# Patient Record
Sex: FEMALE | Race: WHITE | NOT HISPANIC OR LATINO | Employment: FULL TIME | URBAN - NONMETROPOLITAN AREA
[De-identification: names, ages, dates, MRNs, and addresses within clinical notes are randomized per-mention and may not be internally consistent; named-entity substitution may affect disease eponyms.]

---

## 2017-02-19 ENCOUNTER — HOSPITAL ENCOUNTER (EMERGENCY)
Facility: HOSPITAL | Age: 27
Discharge: HOME | End: 2017-02-19
Attending: EMERGENCY MEDICINE | Admitting: EMERGENCY MEDICINE

## 2017-02-19 ENCOUNTER — APPOINTMENT (OUTPATIENT)
Dept: GENERAL RADIOLOGY | Facility: HOSPITAL | Age: 27
End: 2017-02-19

## 2017-02-19 VITALS
HEIGHT: 64 IN | HEART RATE: 94 BPM | BODY MASS INDEX: 35.68 KG/M2 | WEIGHT: 209 LBS | SYSTOLIC BLOOD PRESSURE: 132 MMHG | TEMPERATURE: 99.2 F | OXYGEN SATURATION: 100 % | RESPIRATION RATE: 16 BRPM | DIASTOLIC BLOOD PRESSURE: 74 MMHG

## 2017-02-19 DIAGNOSIS — R10.13 EPIGASTRIC ABDOMINAL PAIN: Primary | ICD-10-CM

## 2017-02-19 LAB
ALBUMIN SERPL-MCNC: 4.3 G/DL (ref 3.4–4.8)
ALBUMIN/GLOB SERPL: 1.2 G/DL (ref 1.1–1.8)
ALP SERPL-CCNC: 76 U/L (ref 38–126)
ALT SERPL W P-5'-P-CCNC: 17 U/L (ref 9–52)
AMYLASE SERPL-CCNC: 37 U/L (ref 50–130)
ANION GAP SERPL CALCULATED.3IONS-SCNC: 12 MMOL/L (ref 5–15)
AST SERPL-CCNC: 27 U/L (ref 14–36)
B-HCG UR QL: NEGATIVE
BACTERIA UR QL AUTO: ABNORMAL /HPF
BASOPHILS # BLD AUTO: 0.02 10*3/MM3 (ref 0–0.2)
BASOPHILS NFR BLD AUTO: 0.2 % (ref 0–2)
BILIRUB SERPL-MCNC: 0.4 MG/DL (ref 0.2–1.3)
BILIRUB UR QL STRIP: NEGATIVE
BUN BLD-MCNC: 7 MG/DL (ref 7–21)
BUN/CREAT SERPL: 7.4 (ref 7–25)
CALCIUM SPEC-SCNC: 9.5 MG/DL (ref 8.4–10.2)
CHLORIDE SERPL-SCNC: 102 MMOL/L (ref 95–110)
CLARITY UR: ABNORMAL
CO2 SERPL-SCNC: 23 MMOL/L (ref 22–31)
COLOR UR: YELLOW
CREAT BLD-MCNC: 0.94 MG/DL (ref 0.5–1)
D-DIMER, QUANTITATIVE (MAD,POW, STR): 316 NG/ML (FEU) (ref 0–470)
DEPRECATED RDW RBC AUTO: 39.7 FL (ref 36.4–46.3)
EOSINOPHIL # BLD AUTO: 0.02 10*3/MM3 (ref 0–0.7)
EOSINOPHIL NFR BLD AUTO: 0.2 % (ref 0–7)
ERYTHROCYTE [DISTWIDTH] IN BLOOD BY AUTOMATED COUNT: 13.6 % (ref 11.5–14.5)
GFR SERPL CREATININE-BSD FRML MDRD: 72 ML/MIN/1.73 (ref 60–165)
GLOBULIN UR ELPH-MCNC: 3.5 GM/DL (ref 2.3–3.5)
GLUCOSE BLD-MCNC: 89 MG/DL (ref 60–100)
GLUCOSE UR STRIP-MCNC: NEGATIVE MG/DL
HCT VFR BLD AUTO: 37.7 % (ref 35–45)
HGB BLD-MCNC: 13.1 G/DL (ref 12–15.5)
HGB UR QL STRIP.AUTO: ABNORMAL
HOLD SPECIMEN: NORMAL
HOLD SPECIMEN: NORMAL
HYALINE CASTS UR QL AUTO: ABNORMAL /LPF
IMM GRANULOCYTES # BLD: 0.01 10*3/MM3 (ref 0–0.02)
IMM GRANULOCYTES NFR BLD: 0.1 % (ref 0–0.5)
KETONES UR QL STRIP: ABNORMAL
LEUKOCYTE ESTERASE UR QL STRIP.AUTO: ABNORMAL
LIPASE SERPL-CCNC: 23 U/L (ref 23–300)
LYMPHOCYTES # BLD AUTO: 1.24 10*3/MM3 (ref 0.6–4.2)
LYMPHOCYTES NFR BLD AUTO: 14.5 % (ref 10–50)
MCH RBC QN AUTO: 27.7 PG (ref 26.5–34)
MCHC RBC AUTO-ENTMCNC: 34.7 G/DL (ref 31.4–36)
MCV RBC AUTO: 79.7 FL (ref 80–98)
MONOCYTES # BLD AUTO: 0.98 10*3/MM3 (ref 0–0.9)
MONOCYTES NFR BLD AUTO: 11.4 % (ref 0–12)
NEUTROPHILS # BLD AUTO: 6.3 10*3/MM3 (ref 2–8.6)
NEUTROPHILS NFR BLD AUTO: 73.6 % (ref 37–80)
NITRITE UR QL STRIP: NEGATIVE
NRBC BLD MANUAL-RTO: 0 /100 WBC (ref 0–0)
PH UR STRIP.AUTO: 6.5 [PH] (ref 5–9)
PLATELET # BLD AUTO: 263 10*3/MM3 (ref 150–450)
PMV BLD AUTO: 11 FL (ref 8–12)
POTASSIUM BLD-SCNC: 4.1 MMOL/L (ref 3.5–5.1)
PROT SERPL-MCNC: 7.8 G/DL (ref 6.3–8.6)
PROT UR QL STRIP: ABNORMAL
RBC # BLD AUTO: 4.73 10*6/MM3 (ref 3.77–5.16)
RBC # UR: ABNORMAL /HPF
REF LAB TEST METHOD: ABNORMAL
SODIUM BLD-SCNC: 137 MMOL/L (ref 137–145)
SP GR UR STRIP: 1.03 (ref 1–1.03)
SQUAMOUS #/AREA URNS HPF: ABNORMAL /HPF
TROPONIN I SERPL-MCNC: <0.012 NG/ML
TROPONIN I SERPL-MCNC: <0.012 NG/ML
UROBILINOGEN UR QL STRIP: ABNORMAL
WBC NRBC COR # BLD: 8.57 10*3/MM3 (ref 3.2–9.8)
WBC UR QL AUTO: ABNORMAL /HPF
WHOLE BLOOD HOLD SPECIMEN: NORMAL
WHOLE BLOOD HOLD SPECIMEN: NORMAL

## 2017-02-19 PROCEDURE — 93010 ELECTROCARDIOGRAM REPORT: CPT | Performed by: INTERNAL MEDICINE

## 2017-02-19 RX ORDER — OMEPRAZOLE 20 MG/1
20 CAPSULE, DELAYED RELEASE ORAL DAILY
Qty: 30 CAPSULE | Refills: 0 | Status: SHIPPED | OUTPATIENT
Start: 2017-02-19 | End: 2017-02-24 | Stop reason: HOSPADM

## 2017-02-19 RX ORDER — PANTOPRAZOLE SODIUM 40 MG/10ML
80 INJECTION, POWDER, LYOPHILIZED, FOR SOLUTION INTRAVENOUS ONCE
Status: COMPLETED | OUTPATIENT
Start: 2017-02-19 | End: 2017-02-19

## 2017-02-19 RX ORDER — SODIUM CHLORIDE 0.9 % (FLUSH) 0.9 %
10 SYRINGE (ML) INJECTION AS NEEDED
Status: DISCONTINUED | OUTPATIENT
Start: 2017-02-19 | End: 2017-02-19 | Stop reason: HOSPADM

## 2017-02-19 RX ORDER — CETIRIZINE HYDROCHLORIDE 10 MG/1
10 TABLET ORAL DAILY
COMMUNITY

## 2017-02-19 RX ORDER — NITROGLYCERIN 0.4 MG/1
0.4 TABLET SUBLINGUAL
Status: DISCONTINUED | OUTPATIENT
Start: 2017-02-19 | End: 2017-02-19

## 2017-02-19 RX ORDER — PROMETHAZINE HYDROCHLORIDE 25 MG/ML
12.5 INJECTION, SOLUTION INTRAMUSCULAR; INTRAVENOUS ONCE
Status: COMPLETED | OUTPATIENT
Start: 2017-02-19 | End: 2017-02-19

## 2017-02-19 RX ORDER — MAGNESIUM HYDROXIDE/ALUMINUM HYDROXICE/SIMETHICONE 120; 1200; 1200 MG/30ML; MG/30ML; MG/30ML
30 SUSPENSION ORAL ONCE
Status: COMPLETED | OUTPATIENT
Start: 2017-02-19 | End: 2017-02-19

## 2017-02-19 RX ORDER — MORPHINE SULFATE 2 MG/ML
2 INJECTION, SOLUTION INTRAMUSCULAR; INTRAVENOUS ONCE
Status: COMPLETED | OUTPATIENT
Start: 2017-02-19 | End: 2017-02-19

## 2017-02-19 RX ORDER — ASPIRIN 81 MG/1
324 TABLET, CHEWABLE ORAL ONCE
Status: COMPLETED | OUTPATIENT
Start: 2017-02-19 | End: 2017-02-19

## 2017-02-19 RX ADMIN — Medication 10 ML: at 13:02

## 2017-02-19 RX ADMIN — CEFTRIAXONE 1 G: 1 INJECTION, POWDER, FOR SOLUTION INTRAMUSCULAR; INTRAVENOUS at 13:01

## 2017-02-19 RX ADMIN — ALUMINUM HYDROXIDE, MAGNESIUM HYDROXIDE, AND SIMETHICONE 30 ML: 200; 200; 20 SUSPENSION ORAL at 11:01

## 2017-02-19 RX ADMIN — LIDOCAINE HYDROCHLORIDE 15 ML: 20 SOLUTION ORAL; TOPICAL at 11:01

## 2017-02-19 RX ADMIN — Medication 10 ML: at 14:58

## 2017-02-19 RX ADMIN — MORPHINE SULFATE 2 MG: 2 INJECTION, SOLUTION INTRAMUSCULAR; INTRAVENOUS at 11:39

## 2017-02-19 RX ADMIN — ASPIRIN 81 MG 243 MG: 81 TABLET ORAL at 11:02

## 2017-02-19 RX ADMIN — Medication 10 ML: at 11:43

## 2017-02-19 RX ADMIN — PROMETHAZINE HYDROCHLORIDE 12.5 MG: 25 INJECTION INTRAMUSCULAR; INTRAVENOUS at 11:42

## 2017-02-19 RX ADMIN — PANTOPRAZOLE SODIUM 80 MG: 40 INJECTION, POWDER, FOR SOLUTION INTRAVENOUS at 14:56

## 2017-02-20 ENCOUNTER — APPOINTMENT (OUTPATIENT)
Dept: CT IMAGING | Facility: HOSPITAL | Age: 27
End: 2017-02-20

## 2017-02-20 ENCOUNTER — OFFICE VISIT (OUTPATIENT)
Dept: INTERNAL MEDICINE | Facility: CLINIC | Age: 27
End: 2017-02-20

## 2017-02-20 ENCOUNTER — HOSPITAL ENCOUNTER (OUTPATIENT)
Facility: HOSPITAL | Age: 27
Setting detail: OBSERVATION
Discharge: HOME | End: 2017-02-24
Attending: FAMILY MEDICINE | Admitting: FAMILY MEDICINE

## 2017-02-20 VITALS
HEIGHT: 64 IN | SYSTOLIC BLOOD PRESSURE: 140 MMHG | BODY MASS INDEX: 36.09 KG/M2 | DIASTOLIC BLOOD PRESSURE: 80 MMHG | WEIGHT: 211.4 LBS

## 2017-02-20 DIAGNOSIS — R13.10 DYSPHAGIA, UNSPECIFIED TYPE: Primary | ICD-10-CM

## 2017-02-20 DIAGNOSIS — R07.89 ATYPICAL CHEST PAIN: ICD-10-CM

## 2017-02-20 LAB
ANION GAP SERPL CALCULATED.3IONS-SCNC: 15 MMOL/L (ref 5–15)
B-HCG UR QL: NEGATIVE
BACTERIA SPEC AEROBE CULT: NORMAL
BACTERIA UR QL AUTO: ABNORMAL /HPF
BILIRUB UR QL STRIP: ABNORMAL
BUN BLD-MCNC: 11 MG/DL (ref 7–21)
BUN/CREAT SERPL: 11.7 (ref 7–25)
CALCIUM SPEC-SCNC: 9 MG/DL (ref 8.4–10.2)
CHLORIDE SERPL-SCNC: 104 MMOL/L (ref 95–110)
CLARITY UR: CLEAR
CO2 SERPL-SCNC: 18 MMOL/L (ref 22–31)
COLOR UR: YELLOW
CREAT BLD-MCNC: 0.94 MG/DL (ref 0.5–1)
DEPRECATED RDW RBC AUTO: 40.1 FL (ref 36.4–46.3)
ERYTHROCYTE [DISTWIDTH] IN BLOOD BY AUTOMATED COUNT: 13.6 % (ref 11.5–14.5)
GFR SERPL CREATININE-BSD FRML MDRD: 72 ML/MIN/1.73 (ref 71–165)
GLUCOSE BLD-MCNC: 70 MG/DL (ref 60–100)
GLUCOSE UR STRIP-MCNC: NEGATIVE MG/DL
HCT VFR BLD AUTO: 35.6 % (ref 35–45)
HGB BLD-MCNC: 12 G/DL (ref 12–15.5)
HGB UR QL STRIP.AUTO: ABNORMAL
HYALINE CASTS UR QL AUTO: ABNORMAL /LPF
KETONES UR QL STRIP: ABNORMAL
LEUKOCYTE ESTERASE UR QL STRIP.AUTO: NEGATIVE
MCH RBC QN AUTO: 27.1 PG (ref 26.5–34)
MCHC RBC AUTO-ENTMCNC: 33.7 G/DL (ref 31.4–36)
MCV RBC AUTO: 80.4 FL (ref 80–98)
NITRITE UR QL STRIP: NEGATIVE
PH UR STRIP.AUTO: 5.5 [PH] (ref 5–9)
PLATELET # BLD AUTO: 229 10*3/MM3 (ref 150–450)
PMV BLD AUTO: 11.1 FL (ref 8–12)
POTASSIUM BLD-SCNC: 3.8 MMOL/L (ref 3.5–5.1)
PROT UR QL STRIP: ABNORMAL
RBC # BLD AUTO: 4.43 10*6/MM3 (ref 3.77–5.16)
RBC # UR: ABNORMAL /HPF
REF LAB TEST METHOD: ABNORMAL
SODIUM BLD-SCNC: 137 MMOL/L (ref 137–145)
SP GR UR STRIP: 1.04 (ref 1–1.03)
SQUAMOUS #/AREA URNS HPF: ABNORMAL /HPF
UROBILINOGEN UR QL STRIP: ABNORMAL
WBC NRBC COR # BLD: 8.22 10*3/MM3 (ref 3.2–9.8)
WBC UR QL AUTO: ABNORMAL /HPF

## 2017-02-20 PROCEDURE — 85027 COMPLETE CBC AUTOMATED: CPT | Performed by: NURSE PRACTITIONER

## 2017-02-20 PROCEDURE — 92610 EVALUATE SWALLOWING FUNCTION: CPT | Performed by: SPEECH-LANGUAGE PATHOLOGIST

## 2017-02-20 PROCEDURE — G0378 HOSPITAL OBSERVATION PER HR: HCPCS

## 2017-02-20 PROCEDURE — 81025 URINE PREGNANCY TEST: CPT | Performed by: NURSE PRACTITIONER

## 2017-02-20 PROCEDURE — 99213 OFFICE O/P EST LOW 20 MIN: CPT | Performed by: INTERNAL MEDICINE

## 2017-02-20 PROCEDURE — 87086 URINE CULTURE/COLONY COUNT: CPT | Performed by: NURSE PRACTITIONER

## 2017-02-20 PROCEDURE — 25010000002 HYDROMORPHONE PER 4 MG: Performed by: INTERNAL MEDICINE

## 2017-02-20 PROCEDURE — 80048 BASIC METABOLIC PNL TOTAL CA: CPT | Performed by: NURSE PRACTITIONER

## 2017-02-20 PROCEDURE — G8996 SWALLOW CURRENT STATUS: HCPCS | Performed by: SPEECH-LANGUAGE PATHOLOGIST

## 2017-02-20 PROCEDURE — 71260 CT THORAX DX C+: CPT

## 2017-02-20 PROCEDURE — 96375 TX/PRO/DX INJ NEW DRUG ADDON: CPT

## 2017-02-20 PROCEDURE — G8997 SWALLOW GOAL STATUS: HCPCS | Performed by: SPEECH-LANGUAGE PATHOLOGIST

## 2017-02-20 PROCEDURE — 96376 TX/PRO/DX INJ SAME DRUG ADON: CPT

## 2017-02-20 PROCEDURE — G8998 SWALLOW D/C STATUS: HCPCS | Performed by: SPEECH-LANGUAGE PATHOLOGIST

## 2017-02-20 PROCEDURE — 25010000002 ONDANSETRON PER 1 MG: Performed by: NURSE PRACTITIONER

## 2017-02-20 PROCEDURE — 81001 URINALYSIS AUTO W/SCOPE: CPT | Performed by: NURSE PRACTITIONER

## 2017-02-20 PROCEDURE — 0 IOPAMIDOL 61 % SOLUTION: Performed by: FAMILY MEDICINE

## 2017-02-20 RX ORDER — CETIRIZINE HYDROCHLORIDE 10 MG/1
10 TABLET ORAL DAILY
Status: DISCONTINUED | OUTPATIENT
Start: 2017-02-20 | End: 2017-02-24 | Stop reason: HOSPADM

## 2017-02-20 RX ORDER — NORGESTIMATE AND ETHINYL ESTRADIOL 7DAYSX3 28
1 KIT ORAL DAILY
Status: DISCONTINUED | OUTPATIENT
Start: 2017-02-20 | End: 2017-02-24 | Stop reason: HOSPADM

## 2017-02-20 RX ORDER — MONTELUKAST SODIUM 10 MG/1
10 TABLET ORAL NIGHTLY
Status: DISCONTINUED | OUTPATIENT
Start: 2017-02-20 | End: 2017-02-24 | Stop reason: HOSPADM

## 2017-02-20 RX ORDER — NITROGLYCERIN 0.4 MG/1
0.4 TABLET SUBLINGUAL
Status: DISCONTINUED | OUTPATIENT
Start: 2017-02-20 | End: 2017-02-24 | Stop reason: HOSPADM

## 2017-02-20 RX ORDER — PANTOPRAZOLE SODIUM 40 MG/10ML
40 INJECTION, POWDER, LYOPHILIZED, FOR SOLUTION INTRAVENOUS
Status: DISCONTINUED | OUTPATIENT
Start: 2017-02-20 | End: 2017-02-24 | Stop reason: HOSPADM

## 2017-02-20 RX ORDER — DOCUSATE SODIUM 100 MG/1
100 CAPSULE, LIQUID FILLED ORAL 2 TIMES DAILY
Status: DISCONTINUED | OUTPATIENT
Start: 2017-02-20 | End: 2017-02-24 | Stop reason: HOSPADM

## 2017-02-20 RX ORDER — DOCUSATE SODIUM 100 MG/1
100 CAPSULE, LIQUID FILLED ORAL DAILY
COMMUNITY
End: 2017-02-24 | Stop reason: HOSPADM

## 2017-02-20 RX ORDER — ACETAMINOPHEN 325 MG/1
650 TABLET ORAL EVERY 4 HOURS PRN
Status: DISCONTINUED | OUTPATIENT
Start: 2017-02-20 | End: 2017-02-24 | Stop reason: HOSPADM

## 2017-02-20 RX ORDER — DOCUSATE SODIUM 100 MG/1
100 CAPSULE, LIQUID FILLED ORAL 2 TIMES DAILY
COMMUNITY

## 2017-02-20 RX ORDER — CITALOPRAM 10 MG/1
10 TABLET ORAL DAILY
Status: DISCONTINUED | OUTPATIENT
Start: 2017-02-20 | End: 2017-02-24 | Stop reason: HOSPADM

## 2017-02-20 RX ORDER — SUCRALFATE ORAL 1 G/10ML
1 SUSPENSION ORAL EVERY 6 HOURS SCHEDULED
Status: DISCONTINUED | OUTPATIENT
Start: 2017-02-20 | End: 2017-02-24 | Stop reason: HOSPADM

## 2017-02-20 RX ORDER — SODIUM CHLORIDE 0.9 % (FLUSH) 0.9 %
1-10 SYRINGE (ML) INJECTION AS NEEDED
Status: DISCONTINUED | OUTPATIENT
Start: 2017-02-20 | End: 2017-02-24 | Stop reason: HOSPADM

## 2017-02-20 RX ORDER — ONDANSETRON 2 MG/ML
4 INJECTION INTRAMUSCULAR; INTRAVENOUS EVERY 6 HOURS PRN
Status: DISCONTINUED | OUTPATIENT
Start: 2017-02-20 | End: 2017-02-21

## 2017-02-20 RX ORDER — SODIUM CHLORIDE 9 MG/ML
75 INJECTION, SOLUTION INTRAVENOUS CONTINUOUS
Status: DISCONTINUED | OUTPATIENT
Start: 2017-02-20 | End: 2017-02-24 | Stop reason: HOSPADM

## 2017-02-20 RX ORDER — MORPHINE SULFATE 2 MG/ML
2 INJECTION, SOLUTION INTRAMUSCULAR; INTRAVENOUS EVERY 4 HOURS PRN
Status: DISCONTINUED | OUTPATIENT
Start: 2017-02-20 | End: 2017-02-20

## 2017-02-20 RX ORDER — FAMOTIDINE 10 MG/ML
20 INJECTION, SOLUTION INTRAVENOUS 2 TIMES DAILY
Status: DISCONTINUED | OUTPATIENT
Start: 2017-02-20 | End: 2017-02-24 | Stop reason: HOSPADM

## 2017-02-20 RX ORDER — DICYCLOMINE HCL 20 MG
10 TABLET ORAL 3 TIMES DAILY PRN
Status: DISCONTINUED | OUTPATIENT
Start: 2017-02-20 | End: 2017-02-21

## 2017-02-20 RX ADMIN — SODIUM CHLORIDE 75 ML/HR: 9 INJECTION, SOLUTION INTRAVENOUS at 13:38

## 2017-02-20 RX ADMIN — HYDROMORPHONE HYDROCHLORIDE 0.5 MG: 1 INJECTION, SOLUTION INTRAMUSCULAR; INTRAVENOUS; SUBCUTANEOUS at 19:48

## 2017-02-20 RX ADMIN — FAMOTIDINE 20 MG: 10 INJECTION, SOLUTION INTRAVENOUS at 13:38

## 2017-02-20 RX ADMIN — HYDROMORPHONE HYDROCHLORIDE 0.5 MG: 1 INJECTION, SOLUTION INTRAMUSCULAR; INTRAVENOUS; SUBCUTANEOUS at 23:45

## 2017-02-20 RX ADMIN — ONDANSETRON 4 MG: 2 INJECTION INTRAMUSCULAR; INTRAVENOUS at 13:51

## 2017-02-20 RX ADMIN — ONDANSETRON 4 MG: 2 INJECTION INTRAMUSCULAR; INTRAVENOUS at 23:51

## 2017-02-20 RX ADMIN — ACETAMINOPHEN 650 MG: 325 TABLET ORAL at 13:51

## 2017-02-20 RX ADMIN — FAMOTIDINE 20 MG: 10 INJECTION, SOLUTION INTRAVENOUS at 18:21

## 2017-02-20 RX ADMIN — NITROGLYCERIN 0.4 MG: 0.4 TABLET SUBLINGUAL at 14:11

## 2017-02-20 RX ADMIN — SUCRALFATE 1 G: 1 SUSPENSION ORAL at 21:00

## 2017-02-20 RX ADMIN — NITROGLYCERIN 0.4 MG: 0.4 TABLET SUBLINGUAL at 14:01

## 2017-02-20 RX ADMIN — PANTOPRAZOLE SODIUM 40 MG: 40 INJECTION, POWDER, FOR SOLUTION INTRAVENOUS at 18:21

## 2017-02-20 RX ADMIN — IOPAMIDOL 90 ML: 612 INJECTION, SOLUTION INTRAVENOUS at 21:30

## 2017-02-20 RX ADMIN — NITROGLYCERIN 0.4 MG: 0.4 TABLET SUBLINGUAL at 14:16

## 2017-02-20 NOTE — PROGRESS NOTES
"Subjective   Elsie Carson is a 26 y.o. female who presents complaining of chest pain and dysphagia.  Symptoms have been present for 2 days. Symptoms are worse since yesterday.   Patient has not been able to eat  or drink due to severe pain while swallowing.  She denies any shortness of breath or coughing.    Patient was seen in ER yesterday.  Cardiac enzymes and ECG were normal.   Chest X-Ray did not show any abnormalities.    Patient was given IV Protonix and discharged home on PO Prilosec.  She is feeling worse today.    Past Medical History   Diagnosis Date   • Acute maxillary sinusitis    • Anxiety    • Asthma    • Depression    • Encounter for gynecological examination (general) (routine) without abnormal findings    • Hyperlipidemia    • Hypertension    • Obesity      Past Surgical History   Procedure Laterality Date   • Injection of medication  10/25/2015     Celestone (betamethasone) (1)     • Injection of medication  11/17/2010     Depo Medrol (Methylprednisone) (1)     • Sinus surgery       \"multiple\"   • Tonsillectomy and adenoidectomy     • Myringotomy w/ tubes       has had twice       Social History   Substance Use Topics   • Smoking status: Never Smoker   • Smokeless tobacco: Not on file   • Alcohol use No     Family History   Problem Relation Age of Onset   • Breast cancer Other    • Coronary artery disease Other    • Diabetes Other    • Hyperlipidemia Other    • Hypertension Other    • Hypertension Mother    • Heart disease Father    • Hypertension Father      Allergies   Allergen Reactions   • Amoxicillin    • Augmentin [Amoxicillin-Pot Clavulanate]    • Codeine    • Erythrocin [Erythromycin]    • Penicillins    • Sulfa Antibiotics      Current Facility-Administered Medications on File Prior to Visit   Medication Dose Route Frequency Provider Last Rate Last Dose   • [COMPLETED] cefTRIAXone (ROCEPHIN) 1 g/100 mL 0.9% NS (MBP)  1 g Intravenous Once RAYMOND Taylor   Stopped at 02/19/17 " 1331   • [COMPLETED] pantoprazole (PROTONIX) injection 80 mg  80 mg Intravenous Once RAYMOND Taylor   80 mg at 02/19/17 1456   • [DISCONTINUED] sodium chloride 0.9 % flush 10 mL  10 mL Intravenous PRN Saw Morris MD   10 mL at 02/19/17 1458   • [DISCONTINUED] sodium chloride 0.9 % flush 10 mL  10 mL Intravenous PRN Saw Morris MD   10 mL at 02/19/17 1302     Current Outpatient Prescriptions on File Prior to Visit   Medication Sig Dispense Refill   • amLODIPine (NORVASC) 5 MG tablet Take 1 tablet by mouth Daily. 30 tablet 5   • cetirizine (zyrTEC) 10 MG tablet Take 10 mg by mouth Daily.     • citalopram (CeleXA) 10 MG tablet Take 1 tablet by mouth Daily. 30 tablet 0   • dicyclomine (BENTYL) 20 MG tablet TAKE ONE TABLET BY MOUTH THREE TIMES DAILY AS NEEDED 90 tablet 0   • norgestimate-ethinyl estradiol (TRI-SPRINTEC) 0.18/0.215/0.25 MG-35 MCG per tablet Take 1 tablet by mouth Daily. 28 tablet 5   • omeprazole (priLOSEC) 20 MG capsule Take 1 capsule by mouth Daily. 30 capsule 0   • montelukast (SINGULAIR) 10 MG tablet Take 1 tablet by mouth Every Night. 30 tablet 5     Review of Systems   Constitutional: Negative for chills and fever.   HENT: Negative for mouth sores and nosebleeds.    Eyes: Negative for photophobia and visual disturbance.   Respiratory: Negative for apnea, cough and wheezing.    Cardiovascular: Positive for chest pain. Negative for leg swelling.   Gastrointestinal: Positive for nausea. Negative for constipation and diarrhea.        Positive for dysphagia   Endocrine: Negative for cold intolerance, polydipsia and polyuria.   Musculoskeletal: Negative for back pain and joint swelling.   Hematological: Negative for adenopathy. Does not bruise/bleed easily.       Objective   Physical Exam   Constitutional: She is oriented to person, place, and time. She appears well-nourished.   Patient appears uncomfortable   HENT:   Mucous membranes appear dry   Eyes: EOM are normal.   Cardiovascular: Normal rate  and regular rhythm.    Pulmonary/Chest: Effort normal and breath sounds normal.   Abdominal: Soft. Bowel sounds are normal. She exhibits no distension and no mass. No hernia.   Neurological: She is alert and oriented to person, place, and time. She has normal reflexes.   Skin: Skin is warm and dry.   Nursing note and vitals reviewed.      Admission on 02/19/2017, Discharged on 02/19/2017   Component Date Value Ref Range Status   • Glucose 02/19/2017 89  60 - 100 mg/dL Final   • BUN 02/19/2017 7  7 - 21 mg/dL Final   • Creatinine 02/19/2017 0.94  0.50 - 1.00 mg/dL Final   • Sodium 02/19/2017 137  137 - 145 mmol/L Final   • Potassium 02/19/2017 4.1  3.5 - 5.1 mmol/L Final   • Chloride 02/19/2017 102  95 - 110 mmol/L Final   • CO2 02/19/2017 23.0  22.0 - 31.0 mmol/L Final   • Calcium 02/19/2017 9.5  8.4 - 10.2 mg/dL Final   • Total Protein 02/19/2017 7.8  6.3 - 8.6 g/dL Final   • Albumin 02/19/2017 4.30  3.40 - 4.80 g/dL Final   • ALT (SGPT) 02/19/2017 17  9 - 52 U/L Final   • AST (SGOT) 02/19/2017 27  14 - 36 U/L Final   • Alkaline Phosphatase 02/19/2017 76  38 - 126 U/L Final   • Total Bilirubin 02/19/2017 0.4  0.2 - 1.3 mg/dL Final   • eGFR Non  Amer 02/19/2017 72  >60 mL/min/1.73 Final   • Globulin 02/19/2017 3.5  2.3 - 3.5 gm/dL Final   • A/G Ratio 02/19/2017 1.2  1.1 - 1.8 g/dL Final   • BUN/Creatinine Ratio 02/19/2017 7.4  7.0 - 25.0 Final   • Anion Gap 02/19/2017 12.0  5.0 - 15.0 mmol/L Final   • Color, UA 02/19/2017 Yellow  Yellow, Straw, Dark Yellow, Anum Final   • Appearance, UA 02/19/2017 Cloudy* Clear Final   • pH, UA 02/19/2017 6.5  5.0 - 9.0 Final   • Specific Gravity, UA 02/19/2017 1.028  1.003 - 1.030 Final   • Glucose, UA 02/19/2017 Negative  Negative Final   • Ketones, UA 02/19/2017 40 mg/dL (2+)* Negative Final   • Bilirubin, UA 02/19/2017 Negative  Negative Final   • Blood, UA 02/19/2017 Trace* Negative Final   • Protein, UA 02/19/2017 30 mg/dL (1+)* Negative Final   • Leuk Esterase, UA  02/19/2017 Moderate (2+)* Negative Final   • Nitrite, UA 02/19/2017 Negative  Negative Final   • Urobilinogen, UA 02/19/2017 1.0 E.U./dL  0.2 - 1.0 E.U./dL Final   • HCG, Urine QL 02/19/2017 Negative  Negative Final   • WBC 02/19/2017 8.57  3.20 - 9.80 10*3/mm3 Final   • RBC 02/19/2017 4.73  3.77 - 5.16 10*6/mm3 Final   • Hemoglobin 02/19/2017 13.1  12.0 - 15.5 g/dL Final   • Hematocrit 02/19/2017 37.7  35.0 - 45.0 % Final   • MCV 02/19/2017 79.7* 80.0 - 98.0 fL Final   • MCH 02/19/2017 27.7  26.5 - 34.0 pg Final   • MCHC 02/19/2017 34.7  31.4 - 36.0 g/dL Final   • RDW 02/19/2017 13.6  11.5 - 14.5 % Final   • RDW-SD 02/19/2017 39.7  36.4 - 46.3 fl Final   • MPV 02/19/2017 11.0  8.0 - 12.0 fL Final   • Platelets 02/19/2017 263  150 - 450 10*3/mm3 Final   • Neutrophil % 02/19/2017 73.6  37.0 - 80.0 % Final   • Lymphocyte % 02/19/2017 14.5  10.0 - 50.0 % Final   • Monocyte % 02/19/2017 11.4  0.0 - 12.0 % Final   • Eosinophil % 02/19/2017 0.2  0.0 - 7.0 % Final   • Basophil % 02/19/2017 0.2  0.0 - 2.0 % Final   • Immature Grans % 02/19/2017 0.1  0.0 - 0.5 % Final   • Neutrophils, Absolute 02/19/2017 6.30  2.00 - 8.60 10*3/mm3 Final   • Lymphocytes, Absolute 02/19/2017 1.24  0.60 - 4.20 10*3/mm3 Final   • Monocytes, Absolute 02/19/2017 0.98* 0.00 - 0.90 10*3/mm3 Final   • Eosinophils, Absolute 02/19/2017 0.02  0.00 - 0.70 10*3/mm3 Final   • Basophils, Absolute 02/19/2017 0.02  0.00 - 0.20 10*3/mm3 Final   • Immature Grans, Absolute 02/19/2017 0.01  0.00 - 0.02 10*3/mm3 Final   • nRBC 02/19/2017 0.0  0.0 - 0.0 /100 WBC Final   • Extra Tube 02/19/2017 hold for add-on   Final   • Extra Tube 02/19/2017 Hold for add-ons.   Final   • Extra Tube 02/19/2017 hold for add-on   Final   • Extra Tube 02/19/2017 Hold for add-ons.   Final   • RBC, UA 02/19/2017 0-2* None Seen /HPF Final   • WBC, UA 02/19/2017 31-50* None Seen, 0-2, 3-5 /HPF Final   • Bacteria, UA 02/19/2017 2+* None Seen /HPF Final   • Squamous Epithelial Cells, UA  02/19/2017 13-20* None Seen, 0-2 /HPF Final   • Hyaline Casts, UA 02/19/2017 None Seen  None Seen /LPF Final   • Methodology 02/19/2017 Automated Microscopy   Final   • Urine Culture 02/19/2017 Mixed Culture   Final   • D-Dimer, Quantitative 02/19/2017 316  0 - 470 ng/mL (FEU) Final   • Troponin I 02/19/2017 <0.012  <=0.034 ng/mL Final   • Amylase 02/19/2017 37* 50 - 130 U/L Final   • Lipase 02/19/2017 23  23 - 300 U/L Final   • Troponin I 02/19/2017 <0.012  <=0.034 ng/mL Final         Patient Active Problem List   Diagnosis   • Essential hypertension   • Obesity (BMI 35.0-39.9 without comorbidity)   • Mild intermittent asthma without complication   • Generalized anxiety disorder               Assessment    Diagnosis Plan   1. Dysphagia, unspecified type     2. Atypical chest pain           Plan      Patient is referred to [facility] for observation for IV fluids, IV Protionix and further evaluation.

## 2017-02-21 ENCOUNTER — ANESTHESIA EVENT (OUTPATIENT)
Dept: GASTROENTEROLOGY | Facility: HOSPITAL | Age: 27
End: 2017-02-21

## 2017-02-21 ENCOUNTER — ANESTHESIA (OUTPATIENT)
Dept: GASTROENTEROLOGY | Facility: HOSPITAL | Age: 27
End: 2017-02-21

## 2017-02-21 LAB
ANION GAP SERPL CALCULATED.3IONS-SCNC: 13 MMOL/L (ref 5–15)
BUN BLD-MCNC: 8 MG/DL (ref 7–21)
BUN/CREAT SERPL: 9.3 (ref 7–25)
CALCIUM SPEC-SCNC: 8.6 MG/DL (ref 8.4–10.2)
CHLORIDE SERPL-SCNC: 107 MMOL/L (ref 95–110)
CO2 SERPL-SCNC: 17 MMOL/L (ref 22–31)
CREAT BLD-MCNC: 0.86 MG/DL (ref 0.5–1)
DEPRECATED RDW RBC AUTO: 41.1 FL (ref 36.4–46.3)
ERYTHROCYTE [DISTWIDTH] IN BLOOD BY AUTOMATED COUNT: 13.8 % (ref 11.5–14.5)
GFR SERPL CREATININE-BSD FRML MDRD: 80 ML/MIN/1.73 (ref 60–165)
GLUCOSE BLD-MCNC: 70 MG/DL (ref 60–100)
HCT VFR BLD AUTO: 35 % (ref 35–45)
HGB BLD-MCNC: 11.7 G/DL (ref 12–15.5)
HIV1+2 AB SER QL: NEGATIVE
MCH RBC QN AUTO: 27 PG (ref 26.5–34)
MCHC RBC AUTO-ENTMCNC: 33.4 G/DL (ref 31.4–36)
MCV RBC AUTO: 80.8 FL (ref 80–98)
PLATELET # BLD AUTO: 218 10*3/MM3 (ref 150–450)
PMV BLD AUTO: 10.9 FL (ref 8–12)
POTASSIUM BLD-SCNC: 4.1 MMOL/L (ref 3.5–5.1)
RBC # BLD AUTO: 4.33 10*6/MM3 (ref 3.77–5.16)
SODIUM BLD-SCNC: 137 MMOL/L (ref 137–145)
WBC NRBC COR # BLD: 7.41 10*3/MM3 (ref 3.2–9.8)

## 2017-02-21 PROCEDURE — 88312 SPECIAL STAINS GROUP 1: CPT | Performed by: INTERNAL MEDICINE

## 2017-02-21 PROCEDURE — G0378 HOSPITAL OBSERVATION PER HR: HCPCS

## 2017-02-21 PROCEDURE — 96376 TX/PRO/DX INJ SAME DRUG ADON: CPT

## 2017-02-21 PROCEDURE — 25010000002 ACYCLOVIR PER 5 MG: Performed by: INTERNAL MEDICINE

## 2017-02-21 PROCEDURE — 96367 TX/PROPH/DG ADDL SEQ IV INF: CPT

## 2017-02-21 PROCEDURE — 25010000002 HYDROMORPHONE PER 4 MG: Performed by: INTERNAL MEDICINE

## 2017-02-21 PROCEDURE — 85027 COMPLETE CBC AUTOMATED: CPT | Performed by: NURSE PRACTITIONER

## 2017-02-21 PROCEDURE — 96366 THER/PROPH/DIAG IV INF ADDON: CPT

## 2017-02-21 PROCEDURE — G0432 EIA HIV-1/HIV-2 SCREEN: HCPCS | Performed by: NURSE PRACTITIONER

## 2017-02-21 PROCEDURE — 25010000003 FLUCONAZOLE 100-0.9 MG/50ML-% SOLUTION: Performed by: INTERNAL MEDICINE

## 2017-02-21 PROCEDURE — 25010000002 PROPOFOL 10 MG/ML EMULSION: Performed by: NURSE ANESTHETIST, CERTIFIED REGISTERED

## 2017-02-21 PROCEDURE — 88313 SPECIAL STAINS GROUP 2: CPT | Performed by: PATHOLOGY

## 2017-02-21 PROCEDURE — 25010000002 PROMETHAZINE PER 50 MG

## 2017-02-21 PROCEDURE — 88312 SPECIAL STAINS GROUP 1: CPT | Performed by: PATHOLOGY

## 2017-02-21 PROCEDURE — 25010000002 ONDANSETRON PER 1 MG: Performed by: NURSE PRACTITIONER

## 2017-02-21 PROCEDURE — 25010000002 FLUCONAZOLE 100-0.9 MG/50ML-% SOLUTION: Performed by: INTERNAL MEDICINE

## 2017-02-21 PROCEDURE — 88305 TISSUE EXAM BY PATHOLOGIST: CPT | Performed by: PATHOLOGY

## 2017-02-21 PROCEDURE — 88305 TISSUE EXAM BY PATHOLOGIST: CPT | Performed by: INTERNAL MEDICINE

## 2017-02-21 PROCEDURE — 80048 BASIC METABOLIC PNL TOTAL CA: CPT | Performed by: NURSE PRACTITIONER

## 2017-02-21 PROCEDURE — 88313 SPECIAL STAINS GROUP 2: CPT | Performed by: INTERNAL MEDICINE

## 2017-02-21 RX ORDER — DICYCLOMINE HYDROCHLORIDE 10 MG/1
10 CAPSULE ORAL 3 TIMES DAILY PRN
Status: DISCONTINUED | OUTPATIENT
Start: 2017-02-21 | End: 2017-02-24 | Stop reason: HOSPADM

## 2017-02-21 RX ORDER — LIDOCAINE HYDROCHLORIDE 10 MG/ML
INJECTION, SOLUTION INFILTRATION; PERINEURAL AS NEEDED
Status: DISCONTINUED | OUTPATIENT
Start: 2017-02-21 | End: 2017-02-21 | Stop reason: SURG

## 2017-02-21 RX ORDER — ONDANSETRON 2 MG/ML
4 INJECTION INTRAMUSCULAR; INTRAVENOUS ONCE AS NEEDED
Status: DISCONTINUED | OUTPATIENT
Start: 2017-02-21 | End: 2017-02-21

## 2017-02-21 RX ORDER — PROMETHAZINE HYDROCHLORIDE 25 MG/ML
INJECTION, SOLUTION INTRAMUSCULAR; INTRAVENOUS
Status: COMPLETED
Start: 2017-02-21 | End: 2017-02-21

## 2017-02-21 RX ORDER — FLUCONAZOLE, SODIUM CHLORIDE 2 MG/ML
200 INJECTION INTRAVENOUS DAILY
Status: DISCONTINUED | OUTPATIENT
Start: 2017-02-21 | End: 2017-02-24 | Stop reason: HOSPADM

## 2017-02-21 RX ORDER — ONDANSETRON 2 MG/ML
4 INJECTION INTRAMUSCULAR; INTRAVENOUS EVERY 6 HOURS PRN
Status: DISCONTINUED | OUTPATIENT
Start: 2017-02-21 | End: 2017-02-24 | Stop reason: HOSPADM

## 2017-02-21 RX ORDER — PROPOFOL 10 MG/ML
VIAL (ML) INTRAVENOUS AS NEEDED
Status: DISCONTINUED | OUTPATIENT
Start: 2017-02-21 | End: 2017-02-21 | Stop reason: SURG

## 2017-02-21 RX ORDER — PROMETHAZINE HYDROCHLORIDE 25 MG/ML
12.5 INJECTION, SOLUTION INTRAMUSCULAR; INTRAVENOUS EVERY 6 HOURS PRN
Status: DISCONTINUED | OUTPATIENT
Start: 2017-02-21 | End: 2017-02-24 | Stop reason: HOSPADM

## 2017-02-21 RX ADMIN — HYDROMORPHONE HYDROCHLORIDE 0.5 MG: 1 INJECTION, SOLUTION INTRAMUSCULAR; INTRAVENOUS; SUBCUTANEOUS at 16:57

## 2017-02-21 RX ADMIN — ACETAMINOPHEN 650 MG: 325 TABLET ORAL at 17:01

## 2017-02-21 RX ADMIN — FAMOTIDINE 20 MG: 10 INJECTION, SOLUTION INTRAVENOUS at 11:35

## 2017-02-21 RX ADMIN — ONDANSETRON 4 MG: 2 INJECTION INTRAMUSCULAR; INTRAVENOUS at 14:46

## 2017-02-21 RX ADMIN — FAMOTIDINE 20 MG: 10 INJECTION, SOLUTION INTRAVENOUS at 18:55

## 2017-02-21 RX ADMIN — PANTOPRAZOLE SODIUM 40 MG: 40 INJECTION, POWDER, FOR SOLUTION INTRAVENOUS at 11:34

## 2017-02-21 RX ADMIN — HYDROMORPHONE HYDROCHLORIDE 0.5 MG: 1 INJECTION, SOLUTION INTRAMUSCULAR; INTRAVENOUS; SUBCUTANEOUS at 20:48

## 2017-02-21 RX ADMIN — PROMETHAZINE HYDROCHLORIDE 12.5 MG: 25 INJECTION, SOLUTION INTRAMUSCULAR; INTRAVENOUS at 17:15

## 2017-02-21 RX ADMIN — PROPOFOL 160 MG: 10 INJECTION, EMULSION INTRAVENOUS at 09:40

## 2017-02-21 RX ADMIN — CETIRIZINE HYDROCHLORIDE 10 MG: 10 TABLET ORAL at 11:25

## 2017-02-21 RX ADMIN — DOCUSATE SODIUM 100 MG: 100 CAPSULE, LIQUID FILLED ORAL at 11:26

## 2017-02-21 RX ADMIN — PROMETHAZINE HYDROCHLORIDE 12.5 MG: 25 INJECTION INTRAMUSCULAR; INTRAVENOUS at 17:15

## 2017-02-21 RX ADMIN — SODIUM CHLORIDE 75 ML/HR: 9 INJECTION, SOLUTION INTRAVENOUS at 17:01

## 2017-02-21 RX ADMIN — PANTOPRAZOLE SODIUM 40 MG: 40 INJECTION, POWDER, FOR SOLUTION INTRAVENOUS at 18:55

## 2017-02-21 RX ADMIN — NORGESTIMATE AND ETHINYL ESTRADIOL 1 TABLET: KIT at 11:25

## 2017-02-21 RX ADMIN — LIDOCAINE HYDROCHLORIDE 100 MG: 10 INJECTION, SOLUTION INFILTRATION; PERINEURAL at 09:40

## 2017-02-21 RX ADMIN — ONDANSETRON 4 MG: 2 INJECTION INTRAMUSCULAR; INTRAVENOUS at 06:01

## 2017-02-21 RX ADMIN — PROPOFOL 40 MG: 10 INJECTION, EMULSION INTRAVENOUS at 09:42

## 2017-02-21 RX ADMIN — ACYCLOVIR SODIUM 550 MG: 50 INJECTION, SOLUTION INTRAVENOUS at 13:17

## 2017-02-21 RX ADMIN — CITALOPRAM HYDROBROMIDE 10 MG: 10 TABLET ORAL at 11:26

## 2017-02-21 RX ADMIN — PROPOFOL 40 MG: 10 INJECTION, EMULSION INTRAVENOUS at 09:44

## 2017-02-21 RX ADMIN — FLUCONAZOLE 200 MG: 2 INJECTION INTRAVENOUS at 14:41

## 2017-02-21 RX ADMIN — ACYCLOVIR SODIUM 550 MG: 50 INJECTION, SOLUTION INTRAVENOUS at 20:43

## 2017-02-21 RX ADMIN — HYDROMORPHONE HYDROCHLORIDE 0.5 MG: 1 INJECTION, SOLUTION INTRAMUSCULAR; INTRAVENOUS; SUBCUTANEOUS at 03:49

## 2017-02-21 RX ADMIN — HYDROMORPHONE HYDROCHLORIDE 0.5 MG: 1 INJECTION, SOLUTION INTRAMUSCULAR; INTRAVENOUS; SUBCUTANEOUS at 13:17

## 2017-02-21 RX ADMIN — HYDROMORPHONE HYDROCHLORIDE 0.5 MG: 1 INJECTION, SOLUTION INTRAMUSCULAR; INTRAVENOUS; SUBCUTANEOUS at 11:23

## 2017-02-21 RX ADMIN — HYDROMORPHONE HYDROCHLORIDE 0.5 MG: 1 INJECTION, SOLUTION INTRAMUSCULAR; INTRAVENOUS; SUBCUTANEOUS at 23:35

## 2017-02-21 RX ADMIN — HYDROMORPHONE HYDROCHLORIDE 0.5 MG: 1 INJECTION, SOLUTION INTRAMUSCULAR; INTRAVENOUS; SUBCUTANEOUS at 06:01

## 2017-02-21 RX ADMIN — SUCRALFATE 1 G: 1 SUSPENSION ORAL at 18:54

## 2017-02-21 RX ADMIN — SUCRALFATE 1 G: 1 SUSPENSION ORAL at 14:38

## 2017-02-21 NOTE — ANESTHESIA PREPROCEDURE EVALUATION
Anesthesia Evaluation     NPO Status: > 8 hours   Airway   Mallampati: II  TM distance: >3 FB  Neck ROM: full  no difficulty expected  Dental - normal exam     Pulmonary - normal exam   (+) asthma,   Cardiovascular - normal exam    (+) hypertension,       Neuro/Psych  GI/Hepatic/Renal/Endo    (+) obesity,      Musculoskeletal     Abdominal    Substance History      OB/GYN          Other                                    Anesthesia Plan    ASA 3     MAC     intravenous induction   Anesthetic plan and risks discussed with patient.

## 2017-02-21 NOTE — ANESTHESIA POSTPROCEDURE EVALUATION
Patient: Elsie Carson    Procedure Summary     Date Anesthesia Start Anesthesia Stop Room / Location    02/21/17 0936 [id number] [facility] ENDOSCOPY 2 / [facility] ENDOSCOPY       Procedure Diagnosis Surgeon Provider    ESOPHAGOGASTRODUODENOSCOPY WITH FOREIGN BODY REMOVAL (N/A Esophagus) Dysphagia, unspecified type  (Dysphagia, unspecified type [R13.10]) DO Patricio Balbuena CRNA          Anesthesia Type: MAC  Last vitals  BP      Temp      Pulse     Resp      SpO2        Post Anesthesia Care and Evaluation    Patient location during evaluation: bedside  Patient participation: complete - patient participated  Level of consciousness: responsive to physical stimuli  Pain management: adequate  Airway patency: patent  Anesthetic complications: No anesthetic complications  PONV Status: none  Cardiovascular status: acceptable  Respiratory status: acceptable  Hydration status: acceptable

## 2017-02-22 LAB
ANION GAP SERPL CALCULATED.3IONS-SCNC: 13 MMOL/L (ref 5–15)
BACTERIA SPEC AEROBE CULT: NORMAL
BUN BLD-MCNC: 4 MG/DL (ref 7–21)
BUN/CREAT SERPL: 4.9 (ref 7–25)
CALCIUM SPEC-SCNC: 8.5 MG/DL (ref 8.4–10.2)
CHLORIDE SERPL-SCNC: 106 MMOL/L (ref 95–110)
CO2 SERPL-SCNC: 18 MMOL/L (ref 22–31)
CREAT BLD-MCNC: 0.82 MG/DL (ref 0.5–1)
DEPRECATED RDW RBC AUTO: 40.5 FL (ref 36.4–46.3)
ERYTHROCYTE [DISTWIDTH] IN BLOOD BY AUTOMATED COUNT: 13.5 % (ref 11.5–14.5)
GFR SERPL CREATININE-BSD FRML MDRD: 84 ML/MIN/1.73 (ref 60–165)
GLUCOSE BLD-MCNC: 75 MG/DL (ref 60–100)
HCT VFR BLD AUTO: 32.7 % (ref 35–45)
HGB BLD-MCNC: 11 G/DL (ref 12–15.5)
LAB AP CASE REPORT: NORMAL
Lab: NORMAL
MCH RBC QN AUTO: 27.2 PG (ref 26.5–34)
MCHC RBC AUTO-ENTMCNC: 33.6 G/DL (ref 31.4–36)
MCV RBC AUTO: 80.9 FL (ref 80–98)
PATH REPORT.FINAL DX SPEC: NORMAL
PATH REPORT.GROSS SPEC: NORMAL
PLATELET # BLD AUTO: 222 10*3/MM3 (ref 150–450)
PMV BLD AUTO: 10.7 FL (ref 8–12)
POTASSIUM BLD-SCNC: 4.1 MMOL/L (ref 3.5–5.1)
RBC # BLD AUTO: 4.04 10*6/MM3 (ref 3.77–5.16)
SODIUM BLD-SCNC: 137 MMOL/L (ref 137–145)
WBC NRBC COR # BLD: 6.67 10*3/MM3 (ref 3.2–9.8)

## 2017-02-22 PROCEDURE — 96376 TX/PRO/DX INJ SAME DRUG ADON: CPT

## 2017-02-22 PROCEDURE — 25010000002 ACYCLOVIR PER 5 MG: Performed by: INTERNAL MEDICINE

## 2017-02-22 PROCEDURE — 85027 COMPLETE CBC AUTOMATED: CPT | Performed by: NURSE PRACTITIONER

## 2017-02-22 PROCEDURE — 96366 THER/PROPH/DIAG IV INF ADDON: CPT

## 2017-02-22 PROCEDURE — 80048 BASIC METABOLIC PNL TOTAL CA: CPT | Performed by: NURSE PRACTITIONER

## 2017-02-22 PROCEDURE — G0378 HOSPITAL OBSERVATION PER HR: HCPCS

## 2017-02-22 PROCEDURE — 25010000002 HYDROMORPHONE PER 4 MG: Performed by: INTERNAL MEDICINE

## 2017-02-22 PROCEDURE — 25010000002 FLUCONAZOLE 100-0.9 MG/50ML-% SOLUTION: Performed by: INTERNAL MEDICINE

## 2017-02-22 PROCEDURE — 25010000003 FLUCONAZOLE 100-0.9 MG/50ML-% SOLUTION: Performed by: INTERNAL MEDICINE

## 2017-02-22 RX ADMIN — ACYCLOVIR SODIUM 550 MG: 50 INJECTION, SOLUTION INTRAVENOUS at 14:16

## 2017-02-22 RX ADMIN — HYDROMORPHONE HYDROCHLORIDE 0.5 MG: 1 INJECTION, SOLUTION INTRAMUSCULAR; INTRAVENOUS; SUBCUTANEOUS at 08:04

## 2017-02-22 RX ADMIN — SODIUM CHLORIDE 75 ML/HR: 9 INJECTION, SOLUTION INTRAVENOUS at 07:58

## 2017-02-22 RX ADMIN — CITALOPRAM HYDROBROMIDE 10 MG: 10 TABLET ORAL at 08:11

## 2017-02-22 RX ADMIN — FAMOTIDINE 20 MG: 10 INJECTION, SOLUTION INTRAVENOUS at 18:17

## 2017-02-22 RX ADMIN — DOCUSATE SODIUM 100 MG: 100 CAPSULE, LIQUID FILLED ORAL at 18:20

## 2017-02-22 RX ADMIN — HYDROMORPHONE HYDROCHLORIDE 0.5 MG: 1 INJECTION, SOLUTION INTRAMUSCULAR; INTRAVENOUS; SUBCUTANEOUS at 04:04

## 2017-02-22 RX ADMIN — FLUCONAZOLE 200 MG: 2 INJECTION INTRAVENOUS at 08:32

## 2017-02-22 RX ADMIN — SUCRALFATE 1 G: 1 SUSPENSION ORAL at 12:13

## 2017-02-22 RX ADMIN — MONTELUKAST SODIUM 10 MG: 10 TABLET, FILM COATED ORAL at 21:08

## 2017-02-22 RX ADMIN — SUCRALFATE 1 G: 1 SUSPENSION ORAL at 06:20

## 2017-02-22 RX ADMIN — DOCUSATE SODIUM 100 MG: 100 CAPSULE, LIQUID FILLED ORAL at 08:12

## 2017-02-22 RX ADMIN — PANTOPRAZOLE SODIUM 40 MG: 40 INJECTION, POWDER, FOR SOLUTION INTRAVENOUS at 08:24

## 2017-02-22 RX ADMIN — ACYCLOVIR SODIUM 550 MG: 50 INJECTION, SOLUTION INTRAVENOUS at 22:06

## 2017-02-22 RX ADMIN — PANTOPRAZOLE SODIUM 40 MG: 40 INJECTION, POWDER, FOR SOLUTION INTRAVENOUS at 18:12

## 2017-02-22 RX ADMIN — ACYCLOVIR SODIUM 550 MG: 50 INJECTION, SOLUTION INTRAVENOUS at 05:07

## 2017-02-22 RX ADMIN — FAMOTIDINE 20 MG: 10 INJECTION, SOLUTION INTRAVENOUS at 09:08

## 2017-02-22 RX ADMIN — HYDROMORPHONE HYDROCHLORIDE 0.5 MG: 1 INJECTION, SOLUTION INTRAMUSCULAR; INTRAVENOUS; SUBCUTANEOUS at 21:17

## 2017-02-22 RX ADMIN — CETIRIZINE HYDROCHLORIDE 10 MG: 10 TABLET ORAL at 08:21

## 2017-02-22 RX ADMIN — SUCRALFATE 1 G: 1 SUSPENSION ORAL at 18:11

## 2017-02-22 RX ADMIN — NORGESTIMATE AND ETHINYL ESTRADIOL 1 TABLET: KIT at 08:22

## 2017-02-22 RX ADMIN — SUCRALFATE 1 G: 1 SUSPENSION ORAL at 00:53

## 2017-02-23 PROBLEM — B00.89 HERPES SIMPLEX ESOPHAGITIS: Status: ACTIVE | Noted: 2017-02-23

## 2017-02-23 PROBLEM — K20.80 HERPES SIMPLEX ESOPHAGITIS: Status: ACTIVE | Noted: 2017-02-23

## 2017-02-23 PROBLEM — K29.70 GASTRITIS: Status: ACTIVE | Noted: 2017-02-23

## 2017-02-23 LAB
ANION GAP SERPL CALCULATED.3IONS-SCNC: 10 MMOL/L (ref 5–15)
BASOPHILS # BLD AUTO: 0.05 10*3/MM3 (ref 0–0.2)
BASOPHILS NFR BLD AUTO: 0.9 % (ref 0–2)
BUN BLD-MCNC: 5 MG/DL (ref 7–21)
BUN/CREAT SERPL: 6.6 (ref 7–25)
CALCIUM SPEC-SCNC: 8.6 MG/DL (ref 8.4–10.2)
CHLORIDE SERPL-SCNC: 104 MMOL/L (ref 95–110)
CO2 SERPL-SCNC: 19 MMOL/L (ref 22–31)
CREAT BLD-MCNC: 0.76 MG/DL (ref 0.5–1)
DEPRECATED RDW RBC AUTO: 39.1 FL (ref 36.4–46.3)
EOSINOPHIL # BLD AUTO: 0.42 10*3/MM3 (ref 0–0.7)
EOSINOPHIL NFR BLD AUTO: 7.2 % (ref 0–7)
ERYTHROCYTE [DISTWIDTH] IN BLOOD BY AUTOMATED COUNT: 13.5 % (ref 11.5–14.5)
GFR SERPL CREATININE-BSD FRML MDRD: 92 ML/MIN/1.73 (ref 60–165)
GLUCOSE BLD-MCNC: 75 MG/DL (ref 60–100)
HCT VFR BLD AUTO: 33.4 % (ref 35–45)
HGB BLD-MCNC: 11.3 G/DL (ref 12–15.5)
IMM GRANULOCYTES # BLD: 0.01 10*3/MM3 (ref 0–0.02)
IMM GRANULOCYTES NFR BLD: 0.2 % (ref 0–0.5)
LYMPHOCYTES # BLD AUTO: 1.84 10*3/MM3 (ref 0.6–4.2)
LYMPHOCYTES NFR BLD AUTO: 31.6 % (ref 10–50)
MCH RBC QN AUTO: 27 PG (ref 26.5–34)
MCHC RBC AUTO-ENTMCNC: 33.8 G/DL (ref 31.4–36)
MCV RBC AUTO: 79.9 FL (ref 80–98)
MONOCYTES # BLD AUTO: 0.31 10*3/MM3 (ref 0–0.9)
MONOCYTES NFR BLD AUTO: 5.3 % (ref 0–12)
NEUTROPHILS # BLD AUTO: 3.19 10*3/MM3 (ref 2–8.6)
NEUTROPHILS NFR BLD AUTO: 54.8 % (ref 37–80)
PLATELET # BLD AUTO: 236 10*3/MM3 (ref 150–450)
PMV BLD AUTO: 10.7 FL (ref 8–12)
POTASSIUM BLD-SCNC: 4.3 MMOL/L (ref 3.5–5.1)
RBC # BLD AUTO: 4.18 10*6/MM3 (ref 3.77–5.16)
SODIUM BLD-SCNC: 133 MMOL/L (ref 137–145)
WBC NRBC COR # BLD: 5.82 10*3/MM3 (ref 3.2–9.8)

## 2017-02-23 PROCEDURE — 25010000003 FLUCONAZOLE 100-0.9 MG/50ML-% SOLUTION: Performed by: INTERNAL MEDICINE

## 2017-02-23 PROCEDURE — 25010000002 HYDROMORPHONE PER 4 MG: Performed by: INTERNAL MEDICINE

## 2017-02-23 PROCEDURE — 80048 BASIC METABOLIC PNL TOTAL CA: CPT | Performed by: NURSE PRACTITIONER

## 2017-02-23 PROCEDURE — 25010000002 FLUCONAZOLE 100-0.9 MG/50ML-% SOLUTION: Performed by: INTERNAL MEDICINE

## 2017-02-23 PROCEDURE — 85025 COMPLETE CBC W/AUTO DIFF WBC: CPT | Performed by: NURSE PRACTITIONER

## 2017-02-23 PROCEDURE — 96366 THER/PROPH/DIAG IV INF ADDON: CPT

## 2017-02-23 PROCEDURE — G0378 HOSPITAL OBSERVATION PER HR: HCPCS

## 2017-02-23 PROCEDURE — 96376 TX/PRO/DX INJ SAME DRUG ADON: CPT

## 2017-02-23 PROCEDURE — 25010000002 ACYCLOVIR PER 5 MG: Performed by: INTERNAL MEDICINE

## 2017-02-23 RX ADMIN — SUCRALFATE 1 G: 1 SUSPENSION ORAL at 05:56

## 2017-02-23 RX ADMIN — ACYCLOVIR SODIUM 550 MG: 50 INJECTION, SOLUTION INTRAVENOUS at 12:30

## 2017-02-23 RX ADMIN — PANTOPRAZOLE SODIUM 40 MG: 40 INJECTION, POWDER, FOR SOLUTION INTRAVENOUS at 17:10

## 2017-02-23 RX ADMIN — PANTOPRAZOLE SODIUM 40 MG: 40 INJECTION, POWDER, FOR SOLUTION INTRAVENOUS at 08:31

## 2017-02-23 RX ADMIN — NORGESTIMATE AND ETHINYL ESTRADIOL 1 TABLET: KIT at 08:31

## 2017-02-23 RX ADMIN — MONTELUKAST SODIUM 10 MG: 10 TABLET, FILM COATED ORAL at 20:37

## 2017-02-23 RX ADMIN — FAMOTIDINE 20 MG: 10 INJECTION, SOLUTION INTRAVENOUS at 08:31

## 2017-02-23 RX ADMIN — SUCRALFATE 1 G: 1 SUSPENSION ORAL at 00:12

## 2017-02-23 RX ADMIN — DOCUSATE SODIUM 100 MG: 100 CAPSULE, LIQUID FILLED ORAL at 17:07

## 2017-02-23 RX ADMIN — DOCUSATE SODIUM 100 MG: 100 CAPSULE, LIQUID FILLED ORAL at 08:30

## 2017-02-23 RX ADMIN — ACYCLOVIR SODIUM 550 MG: 50 INJECTION, SOLUTION INTRAVENOUS at 20:41

## 2017-02-23 RX ADMIN — SODIUM CHLORIDE 75 ML/HR: 9 INJECTION, SOLUTION INTRAVENOUS at 20:37

## 2017-02-23 RX ADMIN — FAMOTIDINE 20 MG: 10 INJECTION, SOLUTION INTRAVENOUS at 17:08

## 2017-02-23 RX ADMIN — SUCRALFATE 1 G: 1 SUSPENSION ORAL at 12:30

## 2017-02-23 RX ADMIN — HYDROMORPHONE HYDROCHLORIDE 0.5 MG: 1 INJECTION, SOLUTION INTRAMUSCULAR; INTRAVENOUS; SUBCUTANEOUS at 20:42

## 2017-02-23 RX ADMIN — SODIUM CHLORIDE 75 ML/HR: 9 INJECTION, SOLUTION INTRAVENOUS at 01:52

## 2017-02-23 RX ADMIN — FLUCONAZOLE 200 MG: 2 INJECTION INTRAVENOUS at 09:55

## 2017-02-23 RX ADMIN — CETIRIZINE HYDROCHLORIDE 10 MG: 10 TABLET ORAL at 08:30

## 2017-02-23 RX ADMIN — CITALOPRAM HYDROBROMIDE 10 MG: 10 TABLET ORAL at 08:30

## 2017-02-23 RX ADMIN — ACYCLOVIR SODIUM 550 MG: 50 INJECTION, SOLUTION INTRAVENOUS at 05:56

## 2017-02-23 RX ADMIN — SUCRALFATE 1 G: 1 SUSPENSION ORAL at 17:53

## 2017-02-24 VITALS
HEIGHT: 64 IN | DIASTOLIC BLOOD PRESSURE: 81 MMHG | WEIGHT: 189.4 LBS | OXYGEN SATURATION: 98 % | HEART RATE: 66 BPM | BODY MASS INDEX: 32.33 KG/M2 | RESPIRATION RATE: 18 BRPM | SYSTOLIC BLOOD PRESSURE: 135 MMHG | TEMPERATURE: 96.9 F

## 2017-02-24 LAB
ANION GAP SERPL CALCULATED.3IONS-SCNC: 13 MMOL/L (ref 5–15)
BASOPHILS # BLD AUTO: 0.03 10*3/MM3 (ref 0–0.2)
BASOPHILS NFR BLD AUTO: 0.5 % (ref 0–2)
BUN BLD-MCNC: 5 MG/DL (ref 7–21)
BUN/CREAT SERPL: 6.3 (ref 7–25)
CALCIUM SPEC-SCNC: 8.7 MG/DL (ref 8.4–10.2)
CHLORIDE SERPL-SCNC: 106 MMOL/L (ref 95–110)
CO2 SERPL-SCNC: 17 MMOL/L (ref 22–31)
CREAT BLD-MCNC: 0.8 MG/DL (ref 0.5–1)
DEPRECATED RDW RBC AUTO: 40.9 FL (ref 36.4–46.3)
EOSINOPHIL # BLD AUTO: 0.44 10*3/MM3 (ref 0–0.7)
EOSINOPHIL NFR BLD AUTO: 6.9 % (ref 0–7)
ERYTHROCYTE [DISTWIDTH] IN BLOOD BY AUTOMATED COUNT: 13.9 % (ref 11.5–14.5)
GFR SERPL CREATININE-BSD FRML MDRD: 87 ML/MIN/1.73 (ref 60–165)
GLUCOSE BLD-MCNC: 72 MG/DL (ref 60–100)
HCT VFR BLD AUTO: 36.9 % (ref 35–45)
HGB BLD-MCNC: 12.3 G/DL (ref 12–15.5)
IMM GRANULOCYTES # BLD: 0.03 10*3/MM3 (ref 0–0.02)
IMM GRANULOCYTES NFR BLD: 0.5 % (ref 0–0.5)
LYMPHOCYTES # BLD AUTO: 1.71 10*3/MM3 (ref 0.6–4.2)
LYMPHOCYTES NFR BLD AUTO: 26.8 % (ref 10–50)
MCH RBC QN AUTO: 26.7 PG (ref 26.5–34)
MCHC RBC AUTO-ENTMCNC: 33.3 G/DL (ref 31.4–36)
MCV RBC AUTO: 80 FL (ref 80–98)
MICROCYTES BLD QL: NORMAL
MONOCYTES # BLD AUTO: 0.38 10*3/MM3 (ref 0–0.9)
MONOCYTES NFR BLD AUTO: 6 % (ref 0–12)
NEUTROPHILS # BLD AUTO: 3.79 10*3/MM3 (ref 2–8.6)
NEUTROPHILS NFR BLD AUTO: 59.3 % (ref 37–80)
PLAT MORPH BLD: NORMAL
PLATELET # BLD AUTO: 150 10*3/MM3 (ref 150–450)
PMV BLD AUTO: ABNORMAL FL (ref 8–12)
POTASSIUM BLD-SCNC: 4.1 MMOL/L (ref 3.5–5.1)
RBC # BLD AUTO: 4.61 10*6/MM3 (ref 3.77–5.16)
SODIUM BLD-SCNC: 136 MMOL/L (ref 137–145)
WBC MORPH BLD: NORMAL
WBC NRBC COR # BLD: 6.38 10*3/MM3 (ref 3.2–9.8)

## 2017-02-24 PROCEDURE — 80048 BASIC METABOLIC PNL TOTAL CA: CPT | Performed by: NURSE PRACTITIONER

## 2017-02-24 PROCEDURE — 96376 TX/PRO/DX INJ SAME DRUG ADON: CPT

## 2017-02-24 PROCEDURE — 25010000002 ACYCLOVIR PER 5 MG: Performed by: INTERNAL MEDICINE

## 2017-02-24 PROCEDURE — 85007 BL SMEAR W/DIFF WBC COUNT: CPT | Performed by: NURSE PRACTITIONER

## 2017-02-24 PROCEDURE — 25010000002 FLUCONAZOLE 100-0.9 MG/50ML-% SOLUTION: Performed by: INTERNAL MEDICINE

## 2017-02-24 PROCEDURE — 25010000003 FLUCONAZOLE 100-0.9 MG/50ML-% SOLUTION: Performed by: INTERNAL MEDICINE

## 2017-02-24 PROCEDURE — G0378 HOSPITAL OBSERVATION PER HR: HCPCS

## 2017-02-24 PROCEDURE — 85025 COMPLETE CBC W/AUTO DIFF WBC: CPT | Performed by: NURSE PRACTITIONER

## 2017-02-24 RX ORDER — HYDROCODONE BITARTRATE AND ACETAMINOPHEN 7.5; 325 MG/1; MG/1
1 TABLET ORAL EVERY 4 HOURS PRN
Qty: 20 TABLET | Refills: 0 | Status: SHIPPED | OUTPATIENT
Start: 2017-02-24 | End: 2017-03-03

## 2017-02-24 RX ORDER — ACYCLOVIR 400 MG/1
400 TABLET ORAL 3 TIMES DAILY
Qty: 21 TABLET | Refills: 0 | Status: SHIPPED | OUTPATIENT
Start: 2017-02-24 | End: 2017-04-10

## 2017-02-24 RX ORDER — ONDANSETRON 4 MG/1
4 TABLET, ORALLY DISINTEGRATING ORAL EVERY 8 HOURS PRN
Qty: 20 TABLET | Refills: 0 | Status: SHIPPED | OUTPATIENT
Start: 2017-02-24 | End: 2017-09-20

## 2017-02-24 RX ORDER — PANTOPRAZOLE SODIUM 40 MG/1
40 TABLET, DELAYED RELEASE ORAL DAILY
Qty: 30 TABLET | Refills: 0 | Status: SHIPPED | OUTPATIENT
Start: 2017-02-24 | End: 2017-03-03 | Stop reason: SDUPTHER

## 2017-02-24 RX ORDER — SUCRALFATE ORAL 1 G/10ML
1 SUSPENSION ORAL EVERY 6 HOURS SCHEDULED
Qty: 420 ML | Refills: 2 | Status: SHIPPED | OUTPATIENT
Start: 2017-02-24 | End: 2017-03-03

## 2017-02-24 RX ADMIN — ACYCLOVIR SODIUM 550 MG: 50 INJECTION, SOLUTION INTRAVENOUS at 05:22

## 2017-02-24 RX ADMIN — Medication 3 ML: at 08:30

## 2017-02-24 RX ADMIN — FAMOTIDINE 20 MG: 10 INJECTION, SOLUTION INTRAVENOUS at 08:29

## 2017-02-24 RX ADMIN — SUCRALFATE 1 G: 1 SUSPENSION ORAL at 05:22

## 2017-02-24 RX ADMIN — CITALOPRAM HYDROBROMIDE 10 MG: 10 TABLET ORAL at 08:28

## 2017-02-24 RX ADMIN — PANTOPRAZOLE SODIUM 40 MG: 40 INJECTION, POWDER, FOR SOLUTION INTRAVENOUS at 08:28

## 2017-02-24 RX ADMIN — FLUCONAZOLE 200 MG: 2 INJECTION INTRAVENOUS at 08:29

## 2017-02-24 RX ADMIN — DOCUSATE SODIUM 100 MG: 100 CAPSULE, LIQUID FILLED ORAL at 08:28

## 2017-02-24 RX ADMIN — CETIRIZINE HYDROCHLORIDE 10 MG: 10 TABLET ORAL at 08:29

## 2017-02-24 RX ADMIN — NORGESTIMATE AND ETHINYL ESTRADIOL 1 TABLET: KIT at 08:29

## 2017-02-24 RX ADMIN — SUCRALFATE 1 G: 1 SUSPENSION ORAL at 12:03

## 2017-03-03 ENCOUNTER — OFFICE VISIT (OUTPATIENT)
Dept: INTERNAL MEDICINE | Facility: CLINIC | Age: 27
End: 2017-03-03

## 2017-03-03 VITALS
HEIGHT: 64 IN | DIASTOLIC BLOOD PRESSURE: 70 MMHG | WEIGHT: 207.5 LBS | BODY MASS INDEX: 35.42 KG/M2 | SYSTOLIC BLOOD PRESSURE: 120 MMHG

## 2017-03-03 DIAGNOSIS — B00.89 HERPES SIMPLEX ESOPHAGITIS: Primary | ICD-10-CM

## 2017-03-03 DIAGNOSIS — R13.10 DYSPHAGIA, UNSPECIFIED TYPE: ICD-10-CM

## 2017-03-03 DIAGNOSIS — K29.00 ACUTE GASTRITIS WITHOUT HEMORRHAGE, UNSPECIFIED GASTRITIS TYPE: ICD-10-CM

## 2017-03-03 DIAGNOSIS — K20.80 HERPES SIMPLEX ESOPHAGITIS: Primary | ICD-10-CM

## 2017-03-03 PROCEDURE — 99213 OFFICE O/P EST LOW 20 MIN: CPT | Performed by: INTERNAL MEDICINE

## 2017-03-03 RX ORDER — CIPROFLOXACIN 250 MG/1
TABLET, FILM COATED ORAL
COMMUNITY
Start: 2017-03-02 | End: 2017-04-10

## 2017-03-03 RX ORDER — LEVOFLOXACIN 500 MG/1
TABLET, FILM COATED ORAL
COMMUNITY
Start: 2017-02-27 | End: 2017-04-10

## 2017-03-03 RX ORDER — PANTOPRAZOLE SODIUM 40 MG/1
40 TABLET, DELAYED RELEASE ORAL DAILY
Qty: 30 TABLET | Refills: 2 | Status: SHIPPED | OUTPATIENT
Start: 2017-03-03 | End: 2017-04-10 | Stop reason: SDUPTHER

## 2017-03-03 RX ORDER — OXYCODONE AND ACETAMINOPHEN 7.5; 325 MG/1; MG/1
TABLET ORAL
COMMUNITY
Start: 2017-03-02 | End: 2017-04-10

## 2017-03-03 NOTE — PROGRESS NOTES
"Subjective   Elsie Carson is a 26 y.o. female     Patient is in for recheck visit.  She was recently hospitalized with chest pain and dysphagia.   She underwent EGD which revealed herpetic esophagitis and gastritis.  Patient was treated with IV Protonix and IV Acyclovir. Discharged home on Protonix, Carafate and Acyclovir.  She is doing much better. Still has mild discomfort on swallowing but denies heartburn or chest pain. Denies abdominal pain, nausea or vomiting. Bowels are moving without blood.    She also underwent lithotripsy for kidney stone by  in [facility] in [place].  Past Medical History   Diagnosis Date   • Acute maxillary sinusitis    • Anxiety    • Asthma    • Depression    • Encounter for gynecological examination (general) (routine) without abnormal findings    • Hyperlipidemia    • Hypertension    • Obesity      Past Surgical History   Procedure Laterality Date   • Injection of medication  10/25/2015     Celestone (betamethasone) (1)     • Injection of medication  11/17/2010     Depo Medrol (Methylprednisone) (1)     • Sinus surgery       \"multiple\"   • Tonsillectomy and adenoidectomy     • Myringotomy w/ tubes       has had twice   • Endoscopy with foreign body removal N/A 2/21/2017     Procedure: ESOPHAGOGASTRODUODENOSCOPY WITH FOREIGN BODY REMOVAL;  Surgeon: Tavo House DO;  Location: [facility] ENDOSCOPY;  Service:        Social History   Substance Use Topics   • Smoking status: Never Smoker   • Smokeless tobacco: Not on file   • Alcohol use No     Family History   Problem Relation Age of Onset   • Breast cancer Other    • Coronary artery disease Other    • Diabetes Other    • Hyperlipidemia Other    • Hypertension Other    • Hypertension Mother    • Heart disease Father    • Hypertension Father      Allergies   Allergen Reactions   • Amoxicillin    • Augmentin [Amoxicillin-Pot Clavulanate]    • Codeine    • Erythrocin [Erythromycin]    • Penicillins    • Sulfa " Antibiotics      Current Outpatient Prescriptions on File Prior to Visit   Medication Sig Dispense Refill   • acyclovir (ZOVIRAX) 400 MG tablet Take 1 tablet by mouth 3 (Three) Times a Day. Take no more than 5 doses a day. 21 tablet 0   • amLODIPine (NORVASC) 5 MG tablet Take 1 tablet by mouth Daily. 30 tablet 5   • cetirizine (zyrTEC) 10 MG tablet Take 10 mg by mouth Daily.     • citalopram (CeleXA) 10 MG tablet Take 1 tablet by mouth Daily. 30 tablet 0   • dicyclomine (BENTYL) 20 MG tablet TAKE ONE TABLET BY MOUTH THREE TIMES DAILY AS NEEDED 90 tablet 0   • docusate sodium (COLACE) 100 MG capsule Take 100 mg by mouth 2 (Two) Times a Day.     • Lactobacillus (ACIDOPHOLUS PO) Take 1 capsule by mouth Daily. Pt uses brand name probiotic Accuflora     • montelukast (SINGULAIR) 10 MG tablet Take 1 tablet by mouth Every Night. 30 tablet 5   • norgestimate-ethinyl estradiol (TRI-SPRINTEC) 0.18/0.215/0.25 MG-35 MCG per tablet Take 1 tablet by mouth Daily. 28 tablet 5   • ondansetron ODT (ZOFRAN ODT) 4 MG disintegrating tablet Take 1 tablet by mouth Every 8 (Eight) Hours As Needed for nausea or vomiting. 20 tablet 0   • pantoprazole (PROTONIX) 40 MG EC tablet Take 1 tablet by mouth Daily. 30 tablet 0   • sucralfate (CARAFATE) 1 GM/10ML suspension Take 10 mL by mouth Every 6 (Six) Hours. 420 mL 2   • [DISCONTINUED] HYDROcodone-acetaminophen (NORCO) 7.5-325 MG per tablet Take 1 tablet by mouth Every 4 (Four) Hours As Needed for moderate pain (4-6). 20 tablet 0     No current facility-administered medications on file prior to visit.      Review of Systems   Constitutional: Negative for activity change, chills and fever.   HENT: Negative.    Eyes: Negative.    Respiratory: Negative for shortness of breath and wheezing.    Gastrointestinal: Positive for nausea. Negative for abdominal pain, blood in stool, constipation and diarrhea.        Positive for dysphagia   Endocrine: Negative for cold intolerance, heat intolerance,  polydipsia and polyuria.   Genitourinary: Negative for difficulty urinating, dysuria, frequency and hematuria.   Neurological: Negative for dizziness and light-headedness.       Objective   Physical Exam   Constitutional: She is oriented to person, place, and time. She appears well-developed and well-nourished.   HENT:   Head: Normocephalic and atraumatic.   Nose: Nose normal.   Mouth/Throat: Oropharynx is clear and moist.   Eyes: Conjunctivae are normal.   Neck: Neck supple. No JVD present. No thyromegaly present.   Cardiovascular: Normal rate and regular rhythm.    No murmur heard.  Pulmonary/Chest: Effort normal and breath sounds normal.   Abdominal: Soft. Bowel sounds are normal.   Neurological: She is alert and oriented to person, place, and time. She has normal reflexes.   Skin: Skin is warm and dry.   Psychiatric: She has a normal mood and affect. Her behavior is normal.   Nursing note and vitals reviewed.    Admission on 02/20/2017, Discharged on 02/24/2017   Component Date Value Ref Range Status   • Color, UA 02/20/2017 Yellow  Yellow, Straw, Dark Yellow, Anum Final   • Appearance, UA 02/20/2017 Clear  Clear Final   • pH, UA 02/20/2017 5.5  5.0 - 9.0 Final   • Specific Gravity, UA 02/20/2017 1.044* 1.003 - 1.030 Final   • Glucose, UA 02/20/2017 Negative  Negative Final   • Ketones, UA 02/20/2017 80 mg/dL (3+)* Negative Final   • Bilirubin, UA 02/20/2017 Small (1+)* Negative Final   • Blood, UA 02/20/2017 Trace* Negative Final   • Protein, UA 02/20/2017 30 mg/dL (1+)* Negative Final   • Leuk Esterase, UA 02/20/2017 Negative  Negative Final   • Nitrite, UA 02/20/2017 Negative  Negative Final   • Urobilinogen, UA 02/20/2017 0.2 E.U./dL  0.2 - 1.0 E.U./dL Final   • WBC 02/20/2017 8.22  3.20 - 9.80 10*3/mm3 Final   • RBC 02/20/2017 4.43  3.77 - 5.16 10*6/mm3 Final   • Hemoglobin 02/20/2017 12.0  12.0 - 15.5 g/dL Final   • Hematocrit 02/20/2017 35.6  35.0 - 45.0 % Final   • MCV 02/20/2017 80.4  80.0 - 98.0 fL  Final   • MCH 02/20/2017 27.1  26.5 - 34.0 pg Final   • MCHC 02/20/2017 33.7  31.4 - 36.0 g/dL Final   • RDW 02/20/2017 13.6  11.5 - 14.5 % Final   • RDW-SD 02/20/2017 40.1  36.4 - 46.3 fl Final   • MPV 02/20/2017 11.1  8.0 - 12.0 fL Final   • Platelets 02/20/2017 229  150 - 450 10*3/mm3 Final   • Glucose 02/20/2017 70  60 - 100 mg/dL Final   • BUN 02/20/2017 11  7 - 21 mg/dL Final   • Creatinine 02/20/2017 0.94  0.50 - 1.00 mg/dL Final   • Sodium 02/20/2017 137  137 - 145 mmol/L Final   • Potassium 02/20/2017 3.8  3.5 - 5.1 mmol/L Final   • Chloride 02/20/2017 104  95 - 110 mmol/L Final   • CO2 02/20/2017 18.0* 22.0 - 31.0 mmol/L Final   • Calcium 02/20/2017 9.0  8.4 - 10.2 mg/dL Final   • eGFR Non  Amer 02/20/2017 72  71 - 165 mL/min/1.73 Final   • BUN/Creatinine Ratio 02/20/2017 11.7  7.0 - 25.0 Final   • Anion Gap 02/20/2017 15.0  5.0 - 15.0 mmol/L Final   • HCG, Urine QL 02/20/2017 Negative  Negative Final   • RBC, UA 02/20/2017 3-5* None Seen /HPF Final   • WBC, UA 02/20/2017 Too Numerous to Count* None Seen, 0-2, 3-5 /HPF Final   • Bacteria, UA 02/20/2017 1+* None Seen /HPF Final   • Squamous Epithelial Cells, UA 02/20/2017 6-12* None Seen, 0-2 /HPF Final   • Hyaline Casts, UA 02/20/2017 21-30  None Seen /LPF Final   • Methodology 02/20/2017 Automated Microscopy   Final   • Urine Culture 02/20/2017 Mixed Culture   Final   • Case Report 02/21/2017    Final                    Value:Surgical Pathology Report                         Case: [id number]                                  Authorizing Provider:  Tavo R House, DO       Collected:           02/21/2017 09:48 AM          Ordering Location:     [facility]             Received:            02/21/2017 01:00 PM                                 [facility]                                                     Pathologist:           Ang Hilario MD                                                          Specimens:   1) - Esophagus,  biopsy                                                                              2) - Esophagus, Mid, mid esophagus bx (look for herpes)                                   • Final Diagnosis 02/21/2017    Final                    Value:This result contains rich text formatting which cannot be displayed here.   • Gross Description 02/21/2017    Final                    Value:This result contains rich text formatting which cannot be displayed here.   • WBC 02/21/2017 7.41  3.20 - 9.80 10*3/mm3 Final   • RBC 02/21/2017 4.33  3.77 - 5.16 10*6/mm3 Final   • Hemoglobin 02/21/2017 11.7* 12.0 - 15.5 g/dL Final   • Hematocrit 02/21/2017 35.0  35.0 - 45.0 % Final   • MCV 02/21/2017 80.8  80.0 - 98.0 fL Final   • MCH 02/21/2017 27.0  26.5 - 34.0 pg Final   • MCHC 02/21/2017 33.4  31.4 - 36.0 g/dL Final   • RDW 02/21/2017 13.8  11.5 - 14.5 % Final   • RDW-SD 02/21/2017 41.1  36.4 - 46.3 fl Final   • MPV 02/21/2017 10.9  8.0 - 12.0 fL Final   • Platelets 02/21/2017 218  150 - 450 10*3/mm3 Final   • Glucose 02/21/2017 70  60 - 100 mg/dL Final   • BUN 02/21/2017 8  7 - 21 mg/dL Final   • Creatinine 02/21/2017 0.86  0.50 - 1.00 mg/dL Final   • Sodium 02/21/2017 137  137 - 145 mmol/L Final   • Potassium 02/21/2017 4.1  3.5 - 5.1 mmol/L Final   • Chloride 02/21/2017 107  95 - 110 mmol/L Final   • CO2 02/21/2017 17.0* 22.0 - 31.0 mmol/L Final   • Calcium 02/21/2017 8.6  8.4 - 10.2 mg/dL Final   • eGFR Non African Amer 02/21/2017 80  >60 mL/min/1.73 Final   • BUN/Creatinine Ratio 02/21/2017 9.3  7.0 - 25.0 Final   • Anion Gap 02/21/2017 13.0  5.0 - 15.0 mmol/L Final   • HIV-1/ HIV-2 02/21/2017 Negative  Negative Final   • WBC 02/22/2017 6.67  3.20 - 9.80 10*3/mm3 Final   • RBC 02/22/2017 4.04  3.77 - 5.16 10*6/mm3 Final   • Hemoglobin 02/22/2017 11.0* 12.0 - 15.5 g/dL Final   • Hematocrit 02/22/2017 32.7* 35.0 - 45.0 % Final   • MCV 02/22/2017 80.9  80.0 - 98.0 fL Final   • MCH 02/22/2017 27.2  26.5 - 34.0 pg Final   • MCHC 02/22/2017  33.6  31.4 - 36.0 g/dL Final   • RDW 02/22/2017 13.5  11.5 - 14.5 % Final   • RDW-SD 02/22/2017 40.5  36.4 - 46.3 fl Final   • MPV 02/22/2017 10.7  8.0 - 12.0 fL Final   • Platelets 02/22/2017 222  150 - 450 10*3/mm3 Final   • Glucose 02/22/2017 75  60 - 100 mg/dL Final   • BUN 02/22/2017 4* 7 - 21 mg/dL Final   • Creatinine 02/22/2017 0.82  0.50 - 1.00 mg/dL Final   • Sodium 02/22/2017 137  137 - 145 mmol/L Final   • Potassium 02/22/2017 4.1  3.5 - 5.1 mmol/L Final   • Chloride 02/22/2017 106  95 - 110 mmol/L Final   • CO2 02/22/2017 18.0* 22.0 - 31.0 mmol/L Final   • Calcium 02/22/2017 8.5  8.4 - 10.2 mg/dL Final   • eGFR Non African Amer 02/22/2017 84  >60 mL/min/1.73 Final   • BUN/Creatinine Ratio 02/22/2017 4.9* 7.0 - 25.0 Final   • Anion Gap 02/22/2017 13.0  5.0 - 15.0 mmol/L Final   • Glucose 02/23/2017 75  60 - 100 mg/dL Final   • BUN 02/23/2017 5* 7 - 21 mg/dL Final   • Creatinine 02/23/2017 0.76  0.50 - 1.00 mg/dL Final   • Sodium 02/23/2017 133* 137 - 145 mmol/L Final   • Potassium 02/23/2017 4.3  3.5 - 5.1 mmol/L Final   • Chloride 02/23/2017 104  95 - 110 mmol/L Final   • CO2 02/23/2017 19.0* 22.0 - 31.0 mmol/L Final   • Calcium 02/23/2017 8.6  8.4 - 10.2 mg/dL Final   • eGFR Non  Amer 02/23/2017 92  >60 mL/min/1.73 Final   • BUN/Creatinine Ratio 02/23/2017 6.6* 7.0 - 25.0 Final   • Anion Gap 02/23/2017 10.0  5.0 - 15.0 mmol/L Final   • WBC 02/23/2017 5.82  3.20 - 9.80 10*3/mm3 Final   • RBC 02/23/2017 4.18  3.77 - 5.16 10*6/mm3 Final   • Hemoglobin 02/23/2017 11.3* 12.0 - 15.5 g/dL Final   • Hematocrit 02/23/2017 33.4* 35.0 - 45.0 % Final   • MCV 02/23/2017 79.9* 80.0 - 98.0 fL Final   • MCH 02/23/2017 27.0  26.5 - 34.0 pg Final   • MCHC 02/23/2017 33.8  31.4 - 36.0 g/dL Final   • RDW 02/23/2017 13.5  11.5 - 14.5 % Final   • RDW-SD 02/23/2017 39.1  36.4 - 46.3 fl Final   • MPV 02/23/2017 10.7  8.0 - 12.0 fL Final   • Platelets 02/23/2017 236  150 - 450 10*3/mm3 Final   • Neutrophil % 02/23/2017  54.8  37.0 - 80.0 % Final   • Lymphocyte % 02/23/2017 31.6  10.0 - 50.0 % Final   • Monocyte % 02/23/2017 5.3  0.0 - 12.0 % Final   • Eosinophil % 02/23/2017 7.2* 0.0 - 7.0 % Final   • Basophil % 02/23/2017 0.9  0.0 - 2.0 % Final   • Immature Grans % 02/23/2017 0.2  0.0 - 0.5 % Final   • Neutrophils, Absolute 02/23/2017 3.19  2.00 - 8.60 10*3/mm3 Final   • Lymphocytes, Absolute 02/23/2017 1.84  0.60 - 4.20 10*3/mm3 Final   • Monocytes, Absolute 02/23/2017 0.31  0.00 - 0.90 10*3/mm3 Final   • Eosinophils, Absolute 02/23/2017 0.42  0.00 - 0.70 10*3/mm3 Final   • Basophils, Absolute 02/23/2017 0.05  0.00 - 0.20 10*3/mm3 Final   • Immature Grans, Absolute 02/23/2017 0.01  0.00 - 0.02 10*3/mm3 Final   • Glucose 02/24/2017 72  60 - 100 mg/dL Final   • BUN 02/24/2017 5* 7 - 21 mg/dL Final   • Creatinine 02/24/2017 0.80  0.50 - 1.00 mg/dL Final   • Sodium 02/24/2017 136* 137 - 145 mmol/L Final   • Potassium 02/24/2017 4.1  3.5 - 5.1 mmol/L Final   • Chloride 02/24/2017 106  95 - 110 mmol/L Final   • CO2 02/24/2017 17.0* 22.0 - 31.0 mmol/L Final   • Calcium 02/24/2017 8.7  8.4 - 10.2 mg/dL Final   • eGFR Non  Amer 02/24/2017 87  >60 mL/min/1.73 Final   • BUN/Creatinine Ratio 02/24/2017 6.3* 7.0 - 25.0 Final   • Anion Gap 02/24/2017 13.0  5.0 - 15.0 mmol/L Final   • WBC 02/24/2017 6.38  3.20 - 9.80 10*3/mm3 Final   • RBC 02/24/2017 4.61  3.77 - 5.16 10*6/mm3 Final   • Hemoglobin 02/24/2017 12.3  12.0 - 15.5 g/dL Final   • Hematocrit 02/24/2017 36.9  35.0 - 45.0 % Final   • MCV 02/24/2017 80.0  80.0 - 98.0 fL Final   • MCH 02/24/2017 26.7  26.5 - 34.0 pg Final   • MCHC 02/24/2017 33.3  31.4 - 36.0 g/dL Final   • RDW 02/24/2017 13.9  11.5 - 14.5 % Final   • RDW-SD 02/24/2017 40.9  36.4 - 46.3 fl Final   • MPV 02/24/2017   8.0 - 12.0 fL Final   • Platelets 02/24/2017 150  150 - 450 10*3/mm3 Final   • Neutrophil % 02/24/2017 59.3  37.0 - 80.0 % Final   • Lymphocyte % 02/24/2017 26.8  10.0 - 50.0 % Final   • Monocyte %  02/24/2017 6.0  0.0 - 12.0 % Final   • Eosinophil % 02/24/2017 6.9  0.0 - 7.0 % Final   • Basophil % 02/24/2017 0.5  0.0 - 2.0 % Final   • Immature Grans % 02/24/2017 0.5  0.0 - 0.5 % Final   • Neutrophils, Absolute 02/24/2017 3.79  2.00 - 8.60 10*3/mm3 Final   • Lymphocytes, Absolute 02/24/2017 1.71  0.60 - 4.20 10*3/mm3 Final   • Monocytes, Absolute 02/24/2017 0.38  0.00 - 0.90 10*3/mm3 Final   • Eosinophils, Absolute 02/24/2017 0.44  0.00 - 0.70 10*3/mm3 Final   • Basophils, Absolute 02/24/2017 0.03  0.00 - 0.20 10*3/mm3 Final   • Immature Grans, Absolute 02/24/2017 0.03* 0.00 - 0.02 10*3/mm3 Final   • Microcytes 02/24/2017 Slight/1+  None Seen Final   • WBC Morphology 02/24/2017 Normal  Normal Final   • Platelet Morphology 02/24/2017 Normal  Normal Final   Admission on 02/19/2017, Discharged on 02/19/2017   Component Date Value Ref Range Status   • Glucose 02/19/2017 89  60 - 100 mg/dL Final   • BUN 02/19/2017 7  7 - 21 mg/dL Final   • Creatinine 02/19/2017 0.94  0.50 - 1.00 mg/dL Final   • Sodium 02/19/2017 137  137 - 145 mmol/L Final   • Potassium 02/19/2017 4.1  3.5 - 5.1 mmol/L Final   • Chloride 02/19/2017 102  95 - 110 mmol/L Final   • CO2 02/19/2017 23.0  22.0 - 31.0 mmol/L Final   • Calcium 02/19/2017 9.5  8.4 - 10.2 mg/dL Final   • Total Protein 02/19/2017 7.8  6.3 - 8.6 g/dL Final   • Albumin 02/19/2017 4.30  3.40 - 4.80 g/dL Final   • ALT (SGPT) 02/19/2017 17  9 - 52 U/L Final   • AST (SGOT) 02/19/2017 27  14 - 36 U/L Final   • Alkaline Phosphatase 02/19/2017 76  38 - 126 U/L Final   • Total Bilirubin 02/19/2017 0.4  0.2 - 1.3 mg/dL Final   • eGFR Non  Amer 02/19/2017 72  >60 mL/min/1.73 Final   • Globulin 02/19/2017 3.5  2.3 - 3.5 gm/dL Final   • A/G Ratio 02/19/2017 1.2  1.1 - 1.8 g/dL Final   • BUN/Creatinine Ratio 02/19/2017 7.4  7.0 - 25.0 Final   • Anion Gap 02/19/2017 12.0  5.0 - 15.0 mmol/L Final   • Color, UA 02/19/2017 Yellow  Yellow, Straw, Dark Yellow, Anum Final   • Appearance, UA  02/19/2017 Cloudy* Clear Final   • pH, UA 02/19/2017 6.5  5.0 - 9.0 Final   • Specific Gravity, UA 02/19/2017 1.028  1.003 - 1.030 Final   • Glucose, UA 02/19/2017 Negative  Negative Final   • Ketones, UA 02/19/2017 40 mg/dL (2+)* Negative Final   • Bilirubin, UA 02/19/2017 Negative  Negative Final   • Blood, UA 02/19/2017 Trace* Negative Final   • Protein, UA 02/19/2017 30 mg/dL (1+)* Negative Final   • Leuk Esterase, UA 02/19/2017 Moderate (2+)* Negative Final   • Nitrite, UA 02/19/2017 Negative  Negative Final   • Urobilinogen, UA 02/19/2017 1.0 E.U./dL  0.2 - 1.0 E.U./dL Final   • HCG, Urine QL 02/19/2017 Negative  Negative Final   • WBC 02/19/2017 8.57  3.20 - 9.80 10*3/mm3 Final   • RBC 02/19/2017 4.73  3.77 - 5.16 10*6/mm3 Final   • Hemoglobin 02/19/2017 13.1  12.0 - 15.5 g/dL Final   • Hematocrit 02/19/2017 37.7  35.0 - 45.0 % Final   • MCV 02/19/2017 79.7* 80.0 - 98.0 fL Final   • MCH 02/19/2017 27.7  26.5 - 34.0 pg Final   • MCHC 02/19/2017 34.7  31.4 - 36.0 g/dL Final   • RDW 02/19/2017 13.6  11.5 - 14.5 % Final   • RDW-SD 02/19/2017 39.7  36.4 - 46.3 fl Final   • MPV 02/19/2017 11.0  8.0 - 12.0 fL Final   • Platelets 02/19/2017 263  150 - 450 10*3/mm3 Final   • Neutrophil % 02/19/2017 73.6  37.0 - 80.0 % Final   • Lymphocyte % 02/19/2017 14.5  10.0 - 50.0 % Final   • Monocyte % 02/19/2017 11.4  0.0 - 12.0 % Final   • Eosinophil % 02/19/2017 0.2  0.0 - 7.0 % Final   • Basophil % 02/19/2017 0.2  0.0 - 2.0 % Final   • Immature Grans % 02/19/2017 0.1  0.0 - 0.5 % Final   • Neutrophils, Absolute 02/19/2017 6.30  2.00 - 8.60 10*3/mm3 Final   • Lymphocytes, Absolute 02/19/2017 1.24  0.60 - 4.20 10*3/mm3 Final   • Monocytes, Absolute 02/19/2017 0.98* 0.00 - 0.90 10*3/mm3 Final   • Eosinophils, Absolute 02/19/2017 0.02  0.00 - 0.70 10*3/mm3 Final   • Basophils, Absolute 02/19/2017 0.02  0.00 - 0.20 10*3/mm3 Final   • Immature Grans, Absolute 02/19/2017 0.01  0.00 - 0.02 10*3/mm3 Final   • nRBC 02/19/2017 0.0  0.0  - 0.0 /100 WBC Final   • Extra Tube 02/19/2017 hold for add-on   Final   • Extra Tube 02/19/2017 Hold for add-ons.   Final   • Extra Tube 02/19/2017 hold for add-on   Final   • Extra Tube 02/19/2017 Hold for add-ons.   Final   • RBC, UA 02/19/2017 0-2* None Seen /HPF Final   • WBC, UA 02/19/2017 31-50* None Seen, 0-2, 3-5 /HPF Final   • Bacteria, UA 02/19/2017 2+* None Seen /HPF Final   • Squamous Epithelial Cells, UA 02/19/2017 13-20* None Seen, 0-2 /HPF Final   • Hyaline Casts, UA 02/19/2017 None Seen  None Seen /LPF Final   • Methodology 02/19/2017 Automated Microscopy   Final   • Urine Culture 02/19/2017 Mixed Culture   Final   • D-Dimer, Quantitative 02/19/2017 316  0 - 470 ng/mL (FEU) Final   • Troponin I 02/19/2017 <0.012  <=0.034 ng/mL Final   • Amylase 02/19/2017 37* 50 - 130 U/L Final   • Lipase 02/19/2017 23  23 - 300 U/L Final   • Troponin I 02/19/2017 <0.012  <=0.034 ng/mL Final           Patient Active Problem List   Diagnosis   • Essential hypertension   • Obesity (BMI 35.0-39.9 without comorbidity)   • Mild intermittent asthma without complication   • Generalized anxiety disorder   • Dysphagia   • Herpes simplex esophagitis   • Gastritis               Assessment    Diagnosis Plan   1. Herpes simplex esophagitis     2. Dysphagia, unspecified type     3. Acute gastritis without hemorrhage, unspecified gastritis type           Plan      Patient is advised to finish therapy with PO Acyclovir.  Continue Protonix.  She can discontinue Carafate.  Regular diet.      Follow up in 1 month.

## 2017-03-09 RX ORDER — DICYCLOMINE HCL 20 MG
TABLET ORAL
Qty: 90 TABLET | Refills: 0 | Status: SHIPPED | OUTPATIENT
Start: 2017-03-09

## 2017-04-10 ENCOUNTER — OFFICE VISIT (OUTPATIENT)
Dept: INTERNAL MEDICINE | Facility: CLINIC | Age: 27
End: 2017-04-10

## 2017-04-10 VITALS
DIASTOLIC BLOOD PRESSURE: 80 MMHG | SYSTOLIC BLOOD PRESSURE: 130 MMHG | WEIGHT: 213.7 LBS | HEIGHT: 64 IN | BODY MASS INDEX: 36.48 KG/M2

## 2017-04-10 DIAGNOSIS — J45.20 MILD INTERMITTENT ASTHMA WITHOUT COMPLICATION: ICD-10-CM

## 2017-04-10 DIAGNOSIS — K21.00 GASTROESOPHAGEAL REFLUX DISEASE WITH ESOPHAGITIS: ICD-10-CM

## 2017-04-10 DIAGNOSIS — I10 ESSENTIAL HYPERTENSION: Primary | ICD-10-CM

## 2017-04-10 PROCEDURE — 99213 OFFICE O/P EST LOW 20 MIN: CPT | Performed by: INTERNAL MEDICINE

## 2017-04-10 RX ORDER — MONTELUKAST SODIUM 10 MG/1
10 TABLET ORAL NIGHTLY
Qty: 30 TABLET | Refills: 5 | Status: SHIPPED | OUTPATIENT
Start: 2017-04-10 | End: 2017-09-22 | Stop reason: SDUPTHER

## 2017-04-10 RX ORDER — AMLODIPINE BESYLATE 5 MG/1
5 TABLET ORAL DAILY
Qty: 30 TABLET | Refills: 5 | Status: SHIPPED | OUTPATIENT
Start: 2017-04-10 | End: 2017-09-13 | Stop reason: SDUPTHER

## 2017-04-10 RX ORDER — NORGESTIMATE AND ETHINYL ESTRADIOL 7DAYSX3 28
1 KIT ORAL DAILY
Qty: 28 TABLET | Refills: 5 | Status: SHIPPED | OUTPATIENT
Start: 2017-04-10 | End: 2017-12-01 | Stop reason: SDUPTHER

## 2017-04-10 RX ORDER — PANTOPRAZOLE SODIUM 40 MG/1
40 TABLET, DELAYED RELEASE ORAL DAILY
Qty: 30 TABLET | Refills: 0 | Status: SHIPPED | OUTPATIENT
Start: 2017-04-10 | End: 2017-09-20 | Stop reason: SDUPTHER

## 2017-04-10 NOTE — PROGRESS NOTES
"Subjective   Elsie Brigitte Carson is a 26 y.o. female     She comes for recheck visit.      Her blood pressure is controlled on Norvasc. She denies any cardiovascular complaints.   She is compliant with her therapy and takes medication as prescribed.    Asthma is stable on Singulair. She is using Albuterol on PRN basis.    Patient discontinue Celexa.She denies anxiety, depression or insomnia.    Denies abdominal pain, nausea or vomiting. Symptoms of GERD resolved on Protonix.    Past Medical History:   Diagnosis Date   • Acute maxillary sinusitis    • Anxiety    • Asthma    • Depression    • Encounter for gynecological examination (general) (routine) without abnormal findings    • Hyperlipidemia    • Hypertension    • Obesity      Past Surgical History:   Procedure Laterality Date   • ENDOSCOPY WITH FOREIGN BODY REMOVAL N/A 2/21/2017    Procedure: ESOPHAGOGASTRODUODENOSCOPY WITH FOREIGN BODY REMOVAL;  Surgeon: Tavo House DO;  Location: [facility] ENDOSCOPY;  Service:    • INJECTION OF MEDICATION  10/25/2015    Celestone (betamethasone) (1)     • INJECTION OF MEDICATION  11/17/2010    Depo Medrol (Methylprednisone) (1)     • MYRINGOTOMY W/ TUBES      has had twice   • SINUS SURGERY      \"multiple\"   • TONSILLECTOMY AND ADENOIDECTOMY         Social History   Substance Use Topics   • Smoking status: Never Smoker   • Smokeless tobacco: Not on file   • Alcohol use No     Family History   Problem Relation Age of Onset   • Breast cancer Other    • Coronary artery disease Other    • Diabetes Other    • Hyperlipidemia Other    • Hypertension Other    • Hypertension Mother    • Heart disease Father    • Hypertension Father      Allergies   Allergen Reactions   • Amoxicillin    • Augmentin [Amoxicillin-Pot Clavulanate]    • Codeine    • Erythrocin [Erythromycin]    • Penicillins    • Sulfa Antibiotics      Current Outpatient Prescriptions on File Prior to Visit   Medication Sig Dispense Refill   • amLODIPine (NORVASC) 5 MG " tablet Take 1 tablet by mouth Daily. 30 tablet 5   • cetirizine (zyrTEC) 10 MG tablet Take 10 mg by mouth Daily.     • citalopram (CeleXA) 10 MG tablet Take 1 tablet by mouth Daily. 30 tablet 0   • dicyclomine (BENTYL) 20 MG tablet TAKE ONE TABLET BY MOUTH THREE TIMES DAILY AS NEEDED 90 tablet 0   • docusate sodium (COLACE) 100 MG capsule Take 100 mg by mouth 2 (Two) Times a Day.     • Lactobacillus (ACIDOPHOLUS PO) Take 1 capsule by mouth Daily. Pt uses brand name probiotic Accuflora     • montelukast (SINGULAIR) 10 MG tablet Take 1 tablet by mouth Every Night. 30 tablet 5   • norgestimate-ethinyl estradiol (TRI-SPRINTEC) 0.18/0.215/0.25 MG-35 MCG per tablet Take 1 tablet by mouth Daily. 28 tablet 5   • ondansetron ODT (ZOFRAN ODT) 4 MG disintegrating tablet Take 1 tablet by mouth Every 8 (Eight) Hours As Needed for nausea or vomiting. 20 tablet 0   • pantoprazole (PROTONIX) 40 MG EC tablet Take 1 tablet by mouth Daily. 30 tablet 2   • [DISCONTINUED] acyclovir (ZOVIRAX) 400 MG tablet Take 1 tablet by mouth 3 (Three) Times a Day. Take no more than 5 doses a day. 21 tablet 0   • [DISCONTINUED] ciprofloxacin (CIPRO) 250 MG tablet      • [DISCONTINUED] levoFLOXacin (LEVAQUIN) 500 MG tablet      • [DISCONTINUED] oxyCODONE-acetaminophen (PERCOCET) 7.5-325 MG per tablet        No current facility-administered medications on file prior to visit.      Review of Systems   Constitutional: Negative for activity change, fatigue and fever.   HENT: Negative.    Eyes: Negative.    Respiratory: Negative for chest tightness, shortness of breath and wheezing.    Gastrointestinal: Negative for abdominal pain and diarrhea.   Genitourinary: Negative for difficulty urinating and flank pain.   Musculoskeletal: Negative for back pain.   Neurological: Negative for dizziness and light-headedness.   Psychiatric/Behavioral: The patient is not nervous/anxious.        Objective   Physical Exam   Constitutional: She is oriented to person, place,  and time. She appears well-developed and well-nourished.   HENT:   Head: Normocephalic.   Nose: Nose normal.   Mouth/Throat: Oropharynx is clear and moist.   Neck: Neck supple. No thyromegaly present.   Cardiovascular: Normal rate and regular rhythm.    Pulmonary/Chest: Effort normal and breath sounds normal.   Abdominal: Soft. Bowel sounds are normal. She exhibits no mass.   Neurological: She is alert and oriented to person, place, and time. She has normal reflexes.   Skin: Skin is warm and dry. No rash noted.   Psychiatric: She has a normal mood and affect.   Nursing note and vitals reviewed.          Patient Active Problem List   Diagnosis   • Essential hypertension   • Obesity (BMI 35.0-39.9 without comorbidity)   • Mild intermittent asthma without complication   • Generalized anxiety disorder   • Dysphagia   • Herpes simplex esophagitis   • Gastritis     Results for orders placed or performed during the hospital encounter of 02/20/17   Urine Culture   Result Value Ref Range    Urine Culture Mixed Culture    Urinalysis With / Culture If Indicated   Result Value Ref Range    Color, UA Yellow Yellow, Straw, Dark Yellow, Anum    Appearance, UA Clear Clear    pH, UA 5.5 5.0 - 9.0    Specific Gravity, UA 1.044 (H) 1.003 - 1.030    Glucose, UA Negative Negative    Ketones, UA 80 mg/dL (3+) (A) Negative    Bilirubin, UA Small (1+) (A) Negative    Blood, UA Trace (A) Negative    Protein, UA 30 mg/dL (1+) (A) Negative    Leuk Esterase, UA Negative Negative    Nitrite, UA Negative Negative    Urobilinogen, UA 0.2 E.U./dL 0.2 - 1.0 E.U./dL   CBC (No Diff)   Result Value Ref Range    WBC 8.22 3.20 - 9.80 10*3/mm3    RBC 4.43 3.77 - 5.16 10*6/mm3    Hemoglobin 12.0 12.0 - 15.5 g/dL    Hematocrit 35.6 35.0 - 45.0 %    MCV 80.4 80.0 - 98.0 fL    MCH 27.1 26.5 - 34.0 pg    MCHC 33.7 31.4 - 36.0 g/dL    RDW 13.6 11.5 - 14.5 %    RDW-SD 40.1 36.4 - 46.3 fl    MPV 11.1 8.0 - 12.0 fL    Platelets 229 150 - 450 10*3/mm3   Basic  Metabolic Panel   Result Value Ref Range    Glucose 70 60 - 100 mg/dL    BUN 11 7 - 21 mg/dL    Creatinine 0.94 0.50 - 1.00 mg/dL    Sodium 137 137 - 145 mmol/L    Potassium 3.8 3.5 - 5.1 mmol/L    Chloride 104 95 - 110 mmol/L    CO2 18.0 (L) 22.0 - 31.0 mmol/L    Calcium 9.0 8.4 - 10.2 mg/dL    eGFR Non  Amer 72 71 - 165 mL/min/1.73    BUN/Creatinine Ratio 11.7 7.0 - 25.0    Anion Gap 15.0 5.0 - 15.0 mmol/L   Pregnancy, Urine   Result Value Ref Range    HCG, Urine QL Negative Negative   Urinalysis, Microscopic Only   Result Value Ref Range    RBC, UA 3-5 (A) None Seen /HPF    WBC, UA Too Numerous to Count (A) None Seen, 0-2, 3-5 /HPF    Bacteria, UA 1+ (A) None Seen /HPF    Squamous Epithelial Cells, UA 6-12 (A) None Seen, 0-2 /HPF    Hyaline Casts, UA 21-30 None Seen /LPF    Methodology Automated Microscopy    CBC (No Diff)   Result Value Ref Range    WBC 7.41 3.20 - 9.80 10*3/mm3    RBC 4.33 3.77 - 5.16 10*6/mm3    Hemoglobin 11.7 (L) 12.0 - 15.5 g/dL    Hematocrit 35.0 35.0 - 45.0 %    MCV 80.8 80.0 - 98.0 fL    MCH 27.0 26.5 - 34.0 pg    MCHC 33.4 31.4 - 36.0 g/dL    RDW 13.8 11.5 - 14.5 %    RDW-SD 41.1 36.4 - 46.3 fl    MPV 10.9 8.0 - 12.0 fL    Platelets 218 150 - 450 10*3/mm3   Basic Metabolic Panel   Result Value Ref Range    Glucose 70 60 - 100 mg/dL    BUN 8 7 - 21 mg/dL    Creatinine 0.86 0.50 - 1.00 mg/dL    Sodium 137 137 - 145 mmol/L    Potassium 4.1 3.5 - 5.1 mmol/L    Chloride 107 95 - 110 mmol/L    CO2 17.0 (L) 22.0 - 31.0 mmol/L    Calcium 8.6 8.4 - 10.2 mg/dL    eGFR Non African Amer 80 >60 mL/min/1.73    BUN/Creatinine Ratio 9.3 7.0 - 25.0    Anion Gap 13.0 5.0 - 15.0 mmol/L   HIV-1 & HIV-2 Antibodies   Result Value Ref Range    HIV-1/ HIV-2 Negative Negative   CBC (No Diff)   Result Value Ref Range    WBC 6.67 3.20 - 9.80 10*3/mm3    RBC 4.04 3.77 - 5.16 10*6/mm3    Hemoglobin 11.0 (L) 12.0 - 15.5 g/dL    Hematocrit 32.7 (L) 35.0 - 45.0 %    MCV 80.9 80.0 - 98.0 fL    MCH 27.2 26.5 -  34.0 pg    MCHC 33.6 31.4 - 36.0 g/dL    RDW 13.5 11.5 - 14.5 %    RDW-SD 40.5 36.4 - 46.3 fl    MPV 10.7 8.0 - 12.0 fL    Platelets 222 150 - 450 10*3/mm3   Basic Metabolic Panel   Result Value Ref Range    Glucose 75 60 - 100 mg/dL    BUN 4 (L) 7 - 21 mg/dL    Creatinine 0.82 0.50 - 1.00 mg/dL    Sodium 137 137 - 145 mmol/L    Potassium 4.1 3.5 - 5.1 mmol/L    Chloride 106 95 - 110 mmol/L    CO2 18.0 (L) 22.0 - 31.0 mmol/L    Calcium 8.5 8.4 - 10.2 mg/dL    eGFR Non African Amer 84 >60 mL/min/1.73    BUN/Creatinine Ratio 4.9 (L) 7.0 - 25.0    Anion Gap 13.0 5.0 - 15.0 mmol/L   Basic Metabolic Panel   Result Value Ref Range    Glucose 75 60 - 100 mg/dL    BUN 5 (L) 7 - 21 mg/dL    Creatinine 0.76 0.50 - 1.00 mg/dL    Sodium 133 (L) 137 - 145 mmol/L    Potassium 4.3 3.5 - 5.1 mmol/L    Chloride 104 95 - 110 mmol/L    CO2 19.0 (L) 22.0 - 31.0 mmol/L    Calcium 8.6 8.4 - 10.2 mg/dL    eGFR Non African Amer 92 >60 mL/min/1.73    BUN/Creatinine Ratio 6.6 (L) 7.0 - 25.0    Anion Gap 10.0 5.0 - 15.0 mmol/L   CBC Auto Differential   Result Value Ref Range    WBC 5.82 3.20 - 9.80 10*3/mm3    RBC 4.18 3.77 - 5.16 10*6/mm3    Hemoglobin 11.3 (L) 12.0 - 15.5 g/dL    Hematocrit 33.4 (L) 35.0 - 45.0 %    MCV 79.9 (L) 80.0 - 98.0 fL    MCH 27.0 26.5 - 34.0 pg    MCHC 33.8 31.4 - 36.0 g/dL    RDW 13.5 11.5 - 14.5 %    RDW-SD 39.1 36.4 - 46.3 fl    MPV 10.7 8.0 - 12.0 fL    Platelets 236 150 - 450 10*3/mm3    Neutrophil % 54.8 37.0 - 80.0 %    Lymphocyte % 31.6 10.0 - 50.0 %    Monocyte % 5.3 0.0 - 12.0 %    Eosinophil % 7.2 (H) 0.0 - 7.0 %    Basophil % 0.9 0.0 - 2.0 %    Immature Grans % 0.2 0.0 - 0.5 %    Neutrophils, Absolute 3.19 2.00 - 8.60 10*3/mm3    Lymphocytes, Absolute 1.84 0.60 - 4.20 10*3/mm3    Monocytes, Absolute 0.31 0.00 - 0.90 10*3/mm3    Eosinophils, Absolute 0.42 0.00 - 0.70 10*3/mm3    Basophils, Absolute 0.05 0.00 - 0.20 10*3/mm3    Immature Grans, Absolute 0.01 0.00 - 0.02 10*3/mm3   Basic Metabolic Panel    Result Value Ref Range    Glucose 72 60 - 100 mg/dL    BUN 5 (L) 7 - 21 mg/dL    Creatinine 0.80 0.50 - 1.00 mg/dL    Sodium 136 (L) 137 - 145 mmol/L    Potassium 4.1 3.5 - 5.1 mmol/L    Chloride 106 95 - 110 mmol/L    CO2 17.0 (L) 22.0 - 31.0 mmol/L    Calcium 8.7 8.4 - 10.2 mg/dL    eGFR Non African Amer 87 >60 mL/min/1.73    BUN/Creatinine Ratio 6.3 (L) 7.0 - 25.0    Anion Gap 13.0 5.0 - 15.0 mmol/L   CBC Auto Differential   Result Value Ref Range    WBC 6.38 3.20 - 9.80 10*3/mm3    RBC 4.61 3.77 - 5.16 10*6/mm3    Hemoglobin 12.3 12.0 - 15.5 g/dL    Hematocrit 36.9 35.0 - 45.0 %    MCV 80.0 80.0 - 98.0 fL    MCH 26.7 26.5 - 34.0 pg    MCHC 33.3 31.4 - 36.0 g/dL    RDW 13.9 11.5 - 14.5 %    RDW-SD 40.9 36.4 - 46.3 fl    MPV  8.0 - 12.0 fL    Platelets 150 150 - 450 10*3/mm3    Neutrophil % 59.3 37.0 - 80.0 %    Lymphocyte % 26.8 10.0 - 50.0 %    Monocyte % 6.0 0.0 - 12.0 %    Eosinophil % 6.9 0.0 - 7.0 %    Basophil % 0.5 0.0 - 2.0 %    Immature Grans % 0.5 0.0 - 0.5 %    Neutrophils, Absolute 3.79 2.00 - 8.60 10*3/mm3    Lymphocytes, Absolute 1.71 0.60 - 4.20 10*3/mm3    Monocytes, Absolute 0.38 0.00 - 0.90 10*3/mm3    Eosinophils, Absolute 0.44 0.00 - 0.70 10*3/mm3    Basophils, Absolute 0.03 0.00 - 0.20 10*3/mm3    Immature Grans, Absolute 0.03 (H) 0.00 - 0.02 10*3/mm3   Scan Slide   Result Value Ref Range    Microcytes Slight/1+ None Seen    WBC Morphology Normal Normal    Platelet Morphology Normal Normal   Tissue Exam   Result Value Ref Range    Case Report       Surgical Pathology Report                         Case: [id number]                                  Authorizing Provider:  Tavo House DO       Collected:           02/21/2017 09:48 AM          Ordering Location:     [facility]             Received:            02/21/2017 01:00 PM                                 [facility]                                                     Pathologist:           Ang Hilario MD          "                                                 Specimens:   1) - Esophagus, biopsy                                                                              2) - Esophagus, Mid, mid esophagus bx (look for herpes)                                    Final Diagnosis       1.  MUCOSA, ESOPHAGUS:             MILD CHRONIC INFLAMMATION OF SQUAMOUS MUCOSA AND GLANDULAR MUCOSA.             NEGATIVE FOR FUNGI, VIRAL CELLS, DYSPLASIA AND GOBLET CELL METAPLASIA                  (ALCIAN BLUE/PAS STAIN).     2.  MUCOSA, MID ESOPHAGUS:              MARKED ACUTE ESOPHAGITIS, SQUAMOUS MUCOSA.              NEGATIVE FOR FUNGI (GMS STAIN), VIRAL CELLS, DYSPLASIA AND GOBLET CELL METAPLASIA                    (ALCIAN BLUE/PAS STAIN).       Gross Description       1.  The first container is labeled \"esophagus\" and has bits of white tissue measuring 0.2 cc in aggregate.  The entire specimen is embedded as 1A.     2.  The second container is labeled \"mid esophagus (look for Herpes)\" and has bits of white soft tissue measuring 0.3 cc in aggregate.  The entire specimen is embedded as 2A.       Embedded Images               Assessment    Diagnosis Plan   1. Essential hypertension     2. Mild intermittent asthma without complication     3. Gastroesophageal reflux disease with esophagitis           Plan      1. Patient will continue Norvasc.      DASH diet.      1.5 gr Sodium restriction.      Counseled on nutrition and physical activity.  2. She will continue Singulair and Albuterol inhaler PRN.  3. Patient will change Protonix to PRN.      Advised to avoid fried foods and gastric irritants.        Follow up in 6 months.    "

## 2017-04-20 ENCOUNTER — OFFICE VISIT (OUTPATIENT)
Dept: RETAIL CLINIC | Facility: CLINIC | Age: 27
End: 2017-04-20

## 2017-04-20 VITALS
HEART RATE: 94 BPM | OXYGEN SATURATION: 98 % | RESPIRATION RATE: 18 BRPM | BODY MASS INDEX: 36.54 KG/M2 | SYSTOLIC BLOOD PRESSURE: 168 MMHG | DIASTOLIC BLOOD PRESSURE: 82 MMHG | HEIGHT: 64 IN | TEMPERATURE: 99.4 F | WEIGHT: 214 LBS

## 2017-04-20 DIAGNOSIS — J06.9 VIRAL URI WITH COUGH: Primary | ICD-10-CM

## 2017-04-20 DIAGNOSIS — R07.0 PAIN IN THROAT: ICD-10-CM

## 2017-04-20 LAB
EXPIRATION DATE: NORMAL
INTERNAL CONTROL: NORMAL
Lab: NORMAL
S PYO AG THROAT QL: NEGATIVE

## 2017-04-20 PROCEDURE — 87880 STREP A ASSAY W/OPTIC: CPT | Performed by: NURSE PRACTITIONER

## 2017-04-20 PROCEDURE — 99213 OFFICE O/P EST LOW 20 MIN: CPT | Performed by: NURSE PRACTITIONER

## 2017-04-20 RX ORDER — MOMETASONE FUROATE 50 UG/1
2 SPRAY, METERED NASAL DAILY
Qty: 17 G | Refills: 12 | Status: SHIPPED | OUTPATIENT
Start: 2017-04-20 | End: 2017-04-30

## 2017-04-20 RX ORDER — BROMPHENIRAMINE MALEATE, PSEUDOEPHEDRINE HYDROCHLORIDE, AND DEXTROMETHORPHAN HYDROBROMIDE 2; 30; 10 MG/5ML; MG/5ML; MG/5ML
10 SYRUP ORAL 4 TIMES DAILY PRN
Qty: 200 ML | Refills: 0 | Status: SHIPPED | OUTPATIENT
Start: 2017-04-20 | End: 2017-04-25

## 2017-04-20 NOTE — PROGRESS NOTES
Subjective   Elsie Carson is a 26 y.o. female.     URI    This is a new problem. Episode onset: Runny nose x 1 week, nasal congestion and sore throat began yesterday. The problem has been gradually worsening. Maximum temperature: low grade here today. Associated symptoms include congestion, nausea, rhinorrhea, sinus pain (pressure) and a sore throat. Pertinent negatives include no abdominal pain, coughing, diarrhea, ear pain, headaches, plugged ear sensation, sneezing, swollen glands, vomiting or wheezing. Treatments tried: OTC mucinex sinus. The treatment provided mild relief.        The following portions of the patient's history were reviewed and updated as appropriate: allergies, current medications, past family history, past medical history, past social history, past surgical history and problem list.    Review of Systems   Constitutional: Positive for fever. Negative for chills.   HENT: Positive for congestion, postnasal drip, rhinorrhea, sinus pressure and sore throat. Negative for ear pain and sneezing.    Respiratory: Negative for cough and wheezing.    Cardiovascular: Negative.    Gastrointestinal: Positive for nausea. Negative for abdominal pain, diarrhea and vomiting.   Musculoskeletal: Negative for myalgias.   Neurological: Negative for headaches.       Objective   Physical Exam   Constitutional: She appears well-developed and well-nourished. She is active.   HENT:   Head: Normocephalic.   Right Ear: Hearing, tympanic membrane, external ear and ear canal normal.   Left Ear: Hearing, tympanic membrane, external ear and ear canal normal.   Nose: Mucosal edema present. Right sinus exhibits no maxillary sinus tenderness and no frontal sinus tenderness. Left sinus exhibits no maxillary sinus tenderness and no frontal sinus tenderness.   Mouth/Throat: Uvula is midline and mucous membranes are normal. Posterior oropharyngeal erythema (Mild) present. No oropharyngeal exudate, posterior oropharyngeal  edema or tonsillar abscesses. Tonsils are 0 on the right. Tonsils are 0 on the left. No tonsillar exudate.       Cardiovascular: Normal rate, regular rhythm, S1 normal, S2 normal and normal heart sounds.    Pulmonary/Chest: Effort normal and breath sounds normal. She has no decreased breath sounds. She has no wheezes. She has no rhonchi. She has no rales.   Lymphadenopathy:     She has no cervical adenopathy.   Neurological: She is alert.       Assessment/Plan   Elsie was seen today for sinusitis.    Diagnoses and all orders for this visit:    Viral URI with cough  -     brompheniramine-pseudoephedrine-DM 30-2-10 MG/5ML syrup; Take 10 mL by mouth 4 (Four) Times a Day As Needed for Congestion or Cough for up to 5 days.  -     mometasone (NASONEX) 50 MCG/ACT nasal spray; 2 sprays into each nostril Daily for 10 days.    Pain in throat  -     POC Rapid Strep A      - Patient advised that the illness appears to be viral in nature and is self-limiting. Given medications to improve symptoms.   - Push fluids and rest  - Take Tylenol/Motrin as needed.  - Warm salt water gargles 3-4 times daily prn.  - Follow up with PCP if symptoms worsen or do not improve

## 2017-04-20 NOTE — PATIENT INSTRUCTIONS
Viral Respiratory Infection  A viral respiratory infection is an illness that affects parts of the body used for breathing, like the lungs, nose, and throat. It is caused by a germ called a virus.  Some examples of this kind of infection are:  · A cold.  · The flu (influenza).  · A respiratory syncytial virus (RSV) infection.  HOW DO I KNOW IF I HAVE THIS INFECTION?  Most of the time this infection causes:  · A stuffy or runny nose.  · Yellow or green fluid in the nose.  · A cough.  · Sneezing.  · Tiredness (fatigue).  · Achy muscles.  · A sore throat.  · Sweating or chills.  · A fever.  · A headache.  HOW IS THIS INFECTION TREATED?  If the flu is diagnosed early, it may be treated with an antiviral medicine. This medicine shortens the length of time a person has symptoms. Symptoms may be treated with over-the-counter and prescription medicines, such as:  · Expectorants. These make it easier to cough up mucus.  · Decongestant nasal sprays.  Doctors do not prescribe antibiotic medicines for viral infections. They do not work with this kind of infection.  HOW DO I KNOW IF I SHOULD STAY HOME?  To keep others from getting sick, stay home if you have:  · A fever.  · A lasting cough.  · A sore throat.  · A runny nose.  · Sneezing.  · Muscles aches.  · Headaches.  · Tiredness.  · Weakness.  · Chills.  · Sweating.  · An upset stomach (nausea).  HOME CARE   · Rest as much as possible.  · Take over-the-counter and prescription medicines only as told by your doctor.  · Drink enough fluid to keep your pee (urine) clear or pale yellow.  · Gargle with salt water. Do this 3-4 times per day or as needed. To make a salt-water mixture, dissolve ½-1 tsp of salt in 1 cup of warm water. Make sure the salt dissolves all the way.  · Use nose drops made from salt water. This helps with stuffiness (congestion). It also helps soften the skin around your nose.  · Do not drink alcohol.  · Do not use tobacco products, including cigarettes,  chewing tobacco, and e-cigarettes. If you need help quitting, ask your doctor.  GET HELP IF:  · Your symptoms last for 10 days or longer.  · Your symptoms get worse over time.  · You have a fever.  · You have very bad pain in your face or forehead.  · Parts of your jaw or neck become very swollen.  GET HELP RIGHT AWAY IF:  · You feel pain or pressure in your chest.  · You have shortness of breath.  · You faint or feel like you will faint.  · You keep throwing up (vomiting).  · You feel confused.     This information is not intended to replace advice given to you by your health care provider. Make sure you discuss any questions you have with your health care provider.     Document Released: 11/30/2009 Document Revised: 01/13/2017 Document Reviewed: 05/25/2016  [employer] Patient Education ©2016 [employer]    Sore Throat  A sore throat is a painful, burning, sore, or scratchy feeling of the throat. There may be pain or tenderness when swallowing or talking. You may have other symptoms with a sore throat. These include coughing, sneezing, fever, or a swollen neck. A sore throat is often the first sign of another sickness. These sicknesses may include a cold, flu, strep throat, or an infection called mono. Most sore throats go away without medical treatment.   HOME CARE   · Only take medicine as told by your doctor.  · Drink enough fluids to keep your pee (urine) clear or pale yellow.  · Rest as needed.  · Try using throat sprays, lozenges, or suck on hard candy (if older than 4 years or as told).  · Sip warm liquids, such as broth, herbal tea, or warm water with honey. Try sucking on frozen ice pops or drinking cold liquids.  · Rinse the mouth (gargle) with salt water. Mix 1 teaspoon salt with 8 ounces of water.  · Do not smoke. Avoid being around others when they are smoking.  · Put a humidifier in your bedroom at night to moisten the air. You can also turn on a hot shower and sit in the bathroom for 5-10  minutes. Be sure the bathroom door is closed.  GET HELP RIGHT AWAY IF:   · You have trouble breathing.  · You cannot swallow fluids, soft foods, or your spit (saliva).  · You have more puffiness (swelling) in the throat.  · Your sore throat does not get better in 7 days.  · You feel sick to your stomach (nauseous) and throw up (vomit).  · You have a fever or lasting symptoms for more than 2-3 days.  · You have a fever and your symptoms suddenly get worse.  MAKE SURE YOU:   · Understand these instructions.  · Will watch your condition.  · Will get help right away if you are not doing well or get worse.     This information is not intended to replace advice given to you by your health care provider. Make sure you discuss any questions you have with your health care provider.     Document Released: 09/26/2009 Document Revised: 09/11/2013 Document Reviewed: 10/07/2016  [employer] Interactive Patient Education ©2016 [employer]

## 2017-09-13 RX ORDER — AMLODIPINE BESYLATE 5 MG/1
5 TABLET ORAL DAILY
Qty: 90 TABLET | Refills: 5 | Status: SHIPPED | OUTPATIENT
Start: 2017-09-13 | End: 2017-09-20

## 2017-09-20 ENCOUNTER — OFFICE VISIT (OUTPATIENT)
Dept: INTERNAL MEDICINE | Facility: CLINIC | Age: 27
End: 2017-09-20

## 2017-09-20 VITALS
WEIGHT: 222 LBS | HEIGHT: 64 IN | SYSTOLIC BLOOD PRESSURE: 160 MMHG | BODY MASS INDEX: 37.9 KG/M2 | DIASTOLIC BLOOD PRESSURE: 100 MMHG

## 2017-09-20 DIAGNOSIS — J45.20 MILD INTERMITTENT ASTHMA WITHOUT COMPLICATION: ICD-10-CM

## 2017-09-20 DIAGNOSIS — F41.1 GENERALIZED ANXIETY DISORDER: ICD-10-CM

## 2017-09-20 DIAGNOSIS — I10 ESSENTIAL HYPERTENSION: Primary | ICD-10-CM

## 2017-09-20 DIAGNOSIS — K21.9 GASTROESOPHAGEAL REFLUX DISEASE, ESOPHAGITIS PRESENCE NOT SPECIFIED: ICD-10-CM

## 2017-09-20 PROCEDURE — 99214 OFFICE O/P EST MOD 30 MIN: CPT | Performed by: INTERNAL MEDICINE

## 2017-09-20 RX ORDER — PANTOPRAZOLE SODIUM 40 MG/1
40 TABLET, DELAYED RELEASE ORAL DAILY
Qty: 30 TABLET | Refills: 1 | Status: SHIPPED | OUTPATIENT
Start: 2017-09-20 | End: 2017-11-08 | Stop reason: SDUPTHER

## 2017-09-20 RX ORDER — AMLODIPINE BESYLATE 5 MG/1
10 TABLET ORAL DAILY
Qty: 90 TABLET | Refills: 5 | Status: SHIPPED | OUTPATIENT
Start: 2017-09-20

## 2017-09-20 RX ORDER — CITALOPRAM 10 MG/1
10 TABLET ORAL DAILY
Qty: 30 TABLET | Refills: 1 | Status: SHIPPED | OUTPATIENT
Start: 2017-09-20 | End: 2017-09-29 | Stop reason: SDUPTHER

## 2017-09-20 NOTE — PROGRESS NOTES
"Subjective   Elsie Montiel is a 27 y.o. female     Patient presents for recheck visit and for vitality testing.    BP has been elevated lately. She denies any chest pain or dyspnea. Has been having more headaches lately and feeling flushed at work.  She gained about 8 lbs since last visit.  On Norvasc and takes it as prescribed.    Also complains of being anxious and nervous. Denies depressed mood. She is under stress due to the situation in her family.    Asthma is stable on Singulair and she is using Albuterol on PRN basis.  Denies cough or purulent sputum.      Has been having problems with acid reflux. Complains of heartburn and indigestion. Denies abdominal pain, nausea or vomiting. Denies constipation or diarrhea.       Past Medical History:   Diagnosis Date   • Acute maxillary sinusitis    • Anxiety    • Asthma    • Depression    • Encounter for gynecological examination (general) (routine) without abnormal findings    • Hyperlipidemia    • Hypertension    • Obesity      Past Surgical History:   Procedure Laterality Date   • ENDOSCOPY WITH FOREIGN BODY REMOVAL N/A 2/21/2017    Procedure: ESOPHAGOGASTRODUODENOSCOPY WITH FOREIGN BODY REMOVAL;  Surgeon: Tavo House DO;  Location: [facility] ENDOSCOPY;  Service:    • INJECTION OF MEDICATION  10/25/2015    Celestone (betamethasone) (1)     • INJECTION OF MEDICATION  11/17/2010    Depo Medrol (Methylprednisone) (1)     • MYRINGOTOMY W/ TUBES      has had twice   • SINUS SURGERY      \"multiple\"   • TONSILLECTOMY AND ADENOIDECTOMY         Social History   Substance Use Topics   • Smoking status: Never Smoker   • Smokeless tobacco: Never Used   • Alcohol use No     Family History   Problem Relation Age of Onset   • Breast cancer Other    • Coronary artery disease Other    • Diabetes Other    • Hyperlipidemia Other    • Hypertension Other    • Hypertension Mother    • Heart disease Father    • Hypertension Father      Allergies   Allergen Reactions   • " Amoxicillin    • Sulfa Antibiotics    • Augmentin [Amoxicillin-Pot Clavulanate] Rash   • Codeine Rash   • Erythrocin [Erythromycin] Rash   • Penicillins Rash     Current Outpatient Prescriptions on File Prior to Visit   Medication Sig Dispense Refill   • cetirizine (zyrTEC) 10 MG tablet Take 10 mg by mouth Daily.     • dicyclomine (BENTYL) 20 MG tablet TAKE ONE TABLET BY MOUTH THREE TIMES DAILY AS NEEDED 90 tablet 0   • docusate sodium (COLACE) 100 MG capsule Take 100 mg by mouth 2 (Two) Times a Day.     • Lactobacillus (ACIDOPHOLUS PO) Take 1 capsule by mouth Daily. Pt uses brand name probiotic Accuflora     • montelukast (SINGULAIR) 10 MG tablet Take 1 tablet by mouth Every Night. 30 tablet 5   • norgestimate-ethinyl estradiol (TRI-SPRINTEC) 0.18/0.215/0.25 MG-35 MCG per tablet Take 1 tablet by mouth Daily. 28 tablet 5   • [DISCONTINUED] amLODIPine (NORVASC) 5 MG tablet Take 1 tablet by mouth Daily. 90 tablet 5   • [DISCONTINUED] pantoprazole (PROTONIX) 40 MG EC tablet Take 1 tablet by mouth Daily. 30 tablet 0   • [DISCONTINUED] ondansetron ODT (ZOFRAN ODT) 4 MG disintegrating tablet Take 1 tablet by mouth Every 8 (Eight) Hours As Needed for nausea or vomiting. 20 tablet 0     No current facility-administered medications on file prior to visit.      Review of Systems   Constitutional: Negative for activity change and fatigue.   HENT: Negative.    Eyes: Negative.    Respiratory: Negative for shortness of breath and wheezing.    Cardiovascular: Negative for chest pain, palpitations and leg swelling.   Gastrointestinal: Negative for abdominal pain, constipation and diarrhea.        Positive for acid reflux and heartburn   Endocrine: Negative for cold intolerance, heat intolerance, polydipsia and polyuria.   Genitourinary: Negative for flank pain and frequency.   Musculoskeletal: Negative for back pain and gait problem.   Neurological: Negative for headaches.   Psychiatric/Behavioral: Positive for sleep disturbance.  Negative for agitation, self-injury and suicidal ideas. The patient is nervous/anxious.        Objective   Physical Exam   Constitutional: She appears well-developed and well-nourished.   HENT:   Head: Normocephalic and atraumatic.   Nose: Nose normal.   Mouth/Throat: Oropharynx is clear and moist.   Eyes: Conjunctivae are normal. No scleral icterus.   Neck: Neck supple. No JVD present. No thyromegaly present.   Cardiovascular: Normal rate and regular rhythm.    No murmur heard.  Pulmonary/Chest: Effort normal and breath sounds normal.   Abdominal: Soft. Bowel sounds are normal. She exhibits no mass.   Neurological: She displays normal reflexes. No cranial nerve deficit. She exhibits normal muscle tone. Coordination normal.   Psychiatric: She has a normal mood and affect. Her behavior is normal. Judgment and thought content normal.   Nursing note and vitals reviewed.          Patient Active Problem List   Diagnosis   • Essential hypertension   • Obesity (BMI 35.0-39.9 without comorbidity)   • Mild intermittent asthma without complication   • Generalized anxiety disorder   • Dysphagia   • Herpes simplex esophagitis   • Gastritis               Assessment   Diagnosis Plan   1. Essential hypertension  Comprehensive Metabolic Panel    Lipid Panel   2. Mild intermittent asthma without complication     3. Generalized anxiety disorder     4. Gastroesophageal reflux disease, esophagitis presence not specified     5. BMI 38.0-38.9,adult                Plan      1. Patient will increase Norvasc to 10 mg daily.      DASH.      1.5 gr Sodium restriction.  2. She will continue Singulair and use Albuterol inhaler PRN.      Allergen avoidance and elimination.  3. Patient will be placed back on Celexa 10 mg daily.      Side effects of the medication discussed with the patient.  4. Trial of Protonix 40 mg daily.       Advised to avoid gastric irritants, fried and fatty foods.  5. Counseled on calories restricted diet, weight loss and  physical activity.    Follow up in 1 month.                 This document has been electronically signed by Aggie Landaverde MD on September 20, 2017 4:32 PM

## 2017-09-21 ENCOUNTER — TELEPHONE (OUTPATIENT)
Dept: INTERNAL MEDICINE | Facility: CLINIC | Age: 27
End: 2017-09-21

## 2017-09-21 ENCOUNTER — APPOINTMENT (OUTPATIENT)
Dept: LAB | Facility: HOSPITAL | Age: 27
End: 2017-09-21

## 2017-09-21 LAB
ALBUMIN SERPL-MCNC: 4.3 G/DL (ref 3.4–4.8)
ALBUMIN/GLOB SERPL: 1.4 G/DL (ref 1.1–1.8)
ALP SERPL-CCNC: 63 U/L (ref 38–126)
ALT SERPL W P-5'-P-CCNC: 17 U/L (ref 9–52)
ANION GAP SERPL CALCULATED.3IONS-SCNC: 8 MMOL/L (ref 5–15)
ARTICHOKE IGE QN: 101 MG/DL (ref 1–129)
AST SERPL-CCNC: 21 U/L (ref 14–36)
BILIRUB SERPL-MCNC: 0.6 MG/DL (ref 0.2–1.3)
BUN BLD-MCNC: 9 MG/DL (ref 7–21)
BUN/CREAT SERPL: 9.7 (ref 7–25)
CALCIUM SPEC-SCNC: 9.1 MG/DL (ref 8.4–10.2)
CHLORIDE SERPL-SCNC: 103 MMOL/L (ref 95–110)
CHOLEST SERPL-MCNC: 213 MG/DL (ref 0–199)
CO2 SERPL-SCNC: 27 MMOL/L (ref 22–31)
CREAT BLD-MCNC: 0.93 MG/DL (ref 0.5–1)
GFR SERPL CREATININE-BSD FRML MDRD: 72 ML/MIN/1.73 (ref 71–165)
GLOBULIN UR ELPH-MCNC: 3 GM/DL (ref 2.3–3.5)
GLUCOSE BLD-MCNC: 90 MG/DL (ref 60–100)
HDLC SERPL-MCNC: 77 MG/DL (ref 60–200)
LDLC/HDLC SERPL: 1.45 {RATIO} (ref 0–3.22)
POTASSIUM BLD-SCNC: 4.6 MMOL/L (ref 3.5–5.1)
PROT SERPL-MCNC: 7.3 G/DL (ref 6.3–8.6)
SODIUM BLD-SCNC: 138 MMOL/L (ref 137–145)
TRIGL SERPL-MCNC: 122 MG/DL (ref 20–199)

## 2017-09-21 PROCEDURE — 80061 LIPID PANEL: CPT | Performed by: INTERNAL MEDICINE

## 2017-09-21 PROCEDURE — 36415 COLL VENOUS BLD VENIPUNCTURE: CPT | Performed by: INTERNAL MEDICINE

## 2017-09-21 PROCEDURE — 80053 COMPREHEN METABOLIC PANEL: CPT | Performed by: INTERNAL MEDICINE

## 2017-09-21 RX ORDER — ALBUTEROL SULFATE 90 UG/1
2 AEROSOL, METERED RESPIRATORY (INHALATION) 4 TIMES DAILY PRN
Qty: 1 INHALER | Refills: 1 | Status: SHIPPED | OUTPATIENT
Start: 2017-09-21

## 2017-09-21 NOTE — TELEPHONE ENCOUNTER
Spoke with pt let her know lab ok except cholesterol a little elevated but good cholesterol also high ,no need for meds at this time just try to lose weight and exercise more

## 2017-09-22 RX ORDER — MONTELUKAST SODIUM 10 MG/1
10 TABLET ORAL NIGHTLY
Qty: 90 TABLET | Refills: 0 | Status: SHIPPED | OUTPATIENT
Start: 2017-09-22 | End: 2017-09-26 | Stop reason: SDUPTHER

## 2017-09-26 RX ORDER — MONTELUKAST SODIUM 10 MG/1
10 TABLET ORAL NIGHTLY
Qty: 90 TABLET | Refills: 1 | Status: SHIPPED | OUTPATIENT
Start: 2017-09-26 | End: 2018-05-27 | Stop reason: SDUPTHER

## 2017-09-29 RX ORDER — CITALOPRAM 10 MG/1
10 TABLET ORAL DAILY
Qty: 90 TABLET | Refills: 0 | Status: SHIPPED | OUTPATIENT
Start: 2017-09-29 | End: 2017-11-08 | Stop reason: SDUPTHER

## 2017-11-08 RX ORDER — CITALOPRAM 10 MG/1
10 TABLET ORAL DAILY
Qty: 90 TABLET | Refills: 0 | Status: SHIPPED | OUTPATIENT
Start: 2017-11-08 | End: 2018-02-05

## 2017-11-08 RX ORDER — PANTOPRAZOLE SODIUM 40 MG/1
40 TABLET, DELAYED RELEASE ORAL DAILY
Qty: 90 TABLET | Refills: 0 | Status: SHIPPED | OUTPATIENT
Start: 2017-11-08 | End: 2018-02-05

## 2017-12-04 RX ORDER — NORGESTIMATE AND ETHINYL ESTRADIOL
KIT
Qty: 28 TABLET | Refills: 0 | Status: SHIPPED | OUTPATIENT
Start: 2017-12-04 | End: 2017-12-27 | Stop reason: SDUPTHER

## 2017-12-27 ENCOUNTER — PROCEDURE VISIT (OUTPATIENT)
Dept: INTERNAL MEDICINE | Facility: CLINIC | Age: 27
End: 2017-12-27

## 2017-12-27 VITALS
DIASTOLIC BLOOD PRESSURE: 80 MMHG | WEIGHT: 225.5 LBS | BODY MASS INDEX: 38.5 KG/M2 | SYSTOLIC BLOOD PRESSURE: 130 MMHG | HEIGHT: 64 IN

## 2017-12-27 DIAGNOSIS — Z30.41 ENCOUNTER FOR SURVEILLANCE OF CONTRACEPTIVE PILLS: ICD-10-CM

## 2017-12-27 DIAGNOSIS — Z01.419 WELL WOMAN EXAM WITH ROUTINE GYNECOLOGICAL EXAM: Primary | ICD-10-CM

## 2017-12-27 DIAGNOSIS — Z12.4 ENCOUNTER FOR PAPANICOLAOU SMEAR FOR CERVICAL CANCER SCREENING: ICD-10-CM

## 2017-12-27 PROCEDURE — 99395 PREV VISIT EST AGE 18-39: CPT | Performed by: INTERNAL MEDICINE

## 2017-12-27 PROCEDURE — 88142 CYTOPATH C/V THIN LAYER: CPT | Performed by: INTERNAL MEDICINE

## 2017-12-27 RX ORDER — NORGESTIMATE AND ETHINYL ESTRADIOL 7DAYSX3 28
1 KIT ORAL DAILY
Qty: 90 TABLET | Refills: 4 | Status: SHIPPED | OUTPATIENT
Start: 2017-12-27 | End: 2018-01-02 | Stop reason: SDUPTHER

## 2017-12-27 NOTE — PROGRESS NOTES
"Subjective   History of Present Illness    Elsie Montiel is a 27 y.o. female who presents for GYN exam.    Patient denies any GYN complaints. Denies pelvic pain, vaginal discharge or vaginal bleeding.  No history of endometriosis, gynecologic surgery, herpes simplex, menorrhagia, metrorrhagia, ovarian cysts,  STDs or terminated pregnancy.  Not requesting STDs screening.        Current contraception: OCP (estrogen/progesterone)  Sexually active: Yes   Age at menarche - 14  Date of LMP - 2017  Usually her periods last 5 days and she considers them mild to moderate    Date last pap:   History of abnormal Pap smear: No  Date of last mammogram: Not indicated    She has received HPV vaccination.    /80  Ht 162.6 cm (64.02\")  Wt 102 kg (225 lb 8 oz)  BMI 38.69 kg/m2    Past Medical History:   Diagnosis Date   • Acute maxillary sinusitis    • Anxiety    • Asthma    • Depression    • Encounter for gynecological examination (general) (routine) without abnormal findings    • Hyperlipidemia    • Hypertension    • Obesity        Past Surgical History:   Procedure Laterality Date   • ENDOSCOPY WITH FOREIGN BODY REMOVAL N/A 2017    Procedure: ESOPHAGOGASTRODUODENOSCOPY WITH FOREIGN BODY REMOVAL;  Surgeon: Tavo House DO;  Location: [facility] ENDOSCOPY;  Service:    • INJECTION OF MEDICATION  10/25/2015    Celestone (betamethasone) (1)     • INJECTION OF MEDICATION  2010    Depo Medrol (Methylprednisone) (1)     • MYRINGOTOMY W/ TUBES      has had twice   • SINUS SURGERY      \"multiple\"   • TONSILLECTOMY AND ADENOIDECTOMY         The following portions of the patient's history were reviewed and updated as appropriate: allergies, current medications, past family history, past medical history, past social history, past surgical history and problem list.         Review of Systems    Constitutional:  No fatigue, No weight loss, No weight gain, No fever and No chills   Respiratory: No dyspnea, " No cough, No hemopytysis, No wheezing and No pleuritic pain   Cardiovascular: No chest pain, No palpitations, No arrhythmia, No orthopnea, No noctural dyspnea, No edema and No claudication   Breasts: No discharge from nipple, No breast tenderness and No breast mass   Gastrointestinal: No loss of appetite, No dysphagia, No abdominal pain, No nausea, No vomiting, No change in bowel habits, No diarrhea, No constipation and No blood in stool   Genitourinary: No increased frequency of urination, No dysuria, No hematuria, No nocturia, No urinary incontinence, No vaginal discharge, No abnormal vaginal bleeding, No pelvic pain, No menstrual problem and No menopausal problem   Skin: No skin rash, No skin lesion, No dry skin, No pruritus and No nail problem   Neurologic: No headache, No dizziness, No lightheadedness, No syncope, No vertigo, No weakness, No numbness, No tremor and No paresthesia   Psychiatric: No difficulty sleeping, No mood swings, No feeling anxious, No confusion and No memory loss          Objective   Physical Exam    General:  Alert and oriented x 3, Cooperative, Well developed & well nourished and No acute distress   HEENT: PERRLA, EOMI   Neck:  No JVD, no mass   Lungs:  Janet, good effort   Cardiac:  Regular, no murmur or rub   Abdomen: Soft, nondistended, non-tender, without masses or organomegaly and Rectal exam normal   Breast: Inspection negative, no nipple discharge or bleeding, no masses or nodularity palpable and Symmetrical breasts in shape, size, consistency   Genitourinary: Vulva normal, vaginal mucosa normal, bladder nondistended and nontender, cervix normal, uterus normal size and nontender, no adnexal/pelvic tenderness or masses, Bartholin's/Crafton/Urethral gland WNL   Skin: Skin warm and dry and Pattern of hair growth normal       Assessment/Plan   Elsie was seen today for gynecologic exam.    Diagnoses and all orders for this visit:    Well woman exam with routine gynecological  exam    Encounter for Papanicolaou smear for cervical cancer screening  -     Liquid-based Pap Smear, Screening - Vaginal Fluid, Cervix    Encounter for surveillance of contraceptive pills    Other orders  -     norgestimate-ethinyl estradiol (TRI-PREVIFEM) 0.18/0.215/0.25 MG-35 MCG per tablet; Take 1 tablet by mouth Daily.      All questions answered.  Recommended self breast exam  Pap smear obtained.  Discussed sexual activity and couseled on sexual health. Recommended condoms for prevention of sexually transmitted diseases.  Discussed contraception methods, including risks/side effects/benefits.  Contraception: OCP (estrogen/progesterone)      Discussed healthy lifestyle modifications.  Recommended weight loss and regular physical activity.            Follow up for routine care.

## 2017-12-27 NOTE — PATIENT INSTRUCTIONS

## 2018-01-02 RX ORDER — NORGESTIMATE AND ETHINYL ESTRADIOL
KIT
Qty: 28 TABLET | Refills: 11 | Status: SHIPPED | OUTPATIENT
Start: 2018-01-02

## 2018-01-04 LAB
LAB AP CASE REPORT: NORMAL
LAB AP GYN ADDITIONAL INFORMATION: NORMAL
LAB AP GYN OTHER FINDINGS: NORMAL
Lab: NORMAL
PATH INTERP SPEC-IMP: NORMAL
STAT OF ADQ CVX/VAG CYTO-IMP: NORMAL

## 2018-01-09 ENCOUNTER — DOCUMENTATION (OUTPATIENT)
Dept: INTERNAL MEDICINE | Facility: CLINIC | Age: 28
End: 2018-01-09

## 2018-02-05 ENCOUNTER — OFFICE VISIT (OUTPATIENT)
Dept: INTERNAL MEDICINE | Facility: CLINIC | Age: 28
End: 2018-02-05

## 2018-02-05 VITALS
SYSTOLIC BLOOD PRESSURE: 140 MMHG | DIASTOLIC BLOOD PRESSURE: 90 MMHG | BODY MASS INDEX: 38.12 KG/M2 | WEIGHT: 223.3 LBS | HEIGHT: 64 IN

## 2018-02-05 DIAGNOSIS — K21.00 GASTROESOPHAGEAL REFLUX DISEASE WITH ESOPHAGITIS: Primary | ICD-10-CM

## 2018-02-05 PROCEDURE — 99213 OFFICE O/P EST LOW 20 MIN: CPT | Performed by: INTERNAL MEDICINE

## 2018-02-05 RX ORDER — SUCRALFATE ORAL 1 G/10ML
1 SUSPENSION ORAL 4 TIMES DAILY
Qty: 420 ML | Refills: 1 | Status: SHIPPED | OUTPATIENT
Start: 2018-02-05 | End: 2018-05-09

## 2018-02-05 RX ORDER — PANTOPRAZOLE SODIUM 40 MG/1
40 TABLET, DELAYED RELEASE ORAL DAILY
Qty: 90 TABLET | Refills: 1 | Status: SHIPPED | OUTPATIENT
Start: 2018-02-05 | End: 2018-10-22 | Stop reason: SDUPTHER

## 2018-02-05 NOTE — PROGRESS NOTES
"Subjective   Elsie Montiel is a 27 y.o. female       Patient is in complaining of worsening symptoms of GERD since last week. Has been having heartburn, substernal pain and nausea.Denies abdominal pain, constipation or diarrhea. Bowels are moving without blood.    Heartburn   She complains of chest pain, early satiety, heartburn and nausea. She reports no abdominal pain, no coughing, no sore throat or no wheezing. This is a new problem. The current episode started in the past 7 days. The problem occurs frequently. The heartburn is of moderate intensity. The heartburn does not wake her from sleep. The symptoms are aggravated by certain foods. Pertinent negatives include no fatigue, melena or weight loss. She has tried a PPI for the symptoms. The treatment provided mild relief. Past procedures include an EGD.       Past Medical History:   Diagnosis Date   • Acute maxillary sinusitis    • Anxiety    • Asthma    • Depression    • Encounter for gynecological examination (general) (routine) without abnormal findings    • Hyperlipidemia    • Hypertension    • Obesity      Past Surgical History:   Procedure Laterality Date   • ENDOSCOPY WITH FOREIGN BODY REMOVAL N/A 2/21/2017    Procedure: ESOPHAGOGASTRODUODENOSCOPY WITH FOREIGN BODY REMOVAL;  Surgeon: Tavo House DO;  Location: [facility] ENDOSCOPY;  Service:    • INJECTION OF MEDICATION  10/25/2015    Celestone (betamethasone) (1)     • INJECTION OF MEDICATION  11/17/2010    Depo Medrol (Methylprednisone) (1)     • MYRINGOTOMY W/ TUBES      has had twice   • SINUS SURGERY      \"multiple\"   • TONSILLECTOMY AND ADENOIDECTOMY         Social History   Substance Use Topics   • Smoking status: Never Smoker   • Smokeless tobacco: Never Used   • Alcohol use No     Family History   Problem Relation Age of Onset   • Breast cancer Other    • Coronary artery disease Other    • Diabetes Other    • Hyperlipidemia Other    • Hypertension Other    • Hypertension Mother    • Heart " disease Father    • Hypertension Father      Allergies   Allergen Reactions   • Amoxicillin    • Sulfa Antibiotics    • Augmentin [Amoxicillin-Pot Clavulanate] Rash   • Codeine Rash   • Erythrocin [Erythromycin] Rash   • Penicillins Rash     Current Outpatient Prescriptions on File Prior to Visit   Medication Sig Dispense Refill   • albuterol (VENTOLIN HFA) 108 (90 Base) MCG/ACT inhaler Inhale 2 puffs 4 (Four) Times a Day As Needed for Wheezing. 1 inhaler 1   • amLODIPine (NORVASC) 5 MG tablet Take 2 tablets by mouth Daily. 90 tablet 5   • cetirizine (zyrTEC) 10 MG tablet Take 10 mg by mouth Daily.     • dicyclomine (BENTYL) 20 MG tablet TAKE ONE TABLET BY MOUTH THREE TIMES DAILY AS NEEDED 90 tablet 0   • docusate sodium (COLACE) 100 MG capsule Take 100 mg by mouth 2 (Two) Times a Day.     • Lactobacillus (ACIDOPHOLUS PO) Take 1 capsule by mouth Daily. Pt uses brand name probiotic Accuflora     • montelukast (SINGULAIR) 10 MG tablet Take 1 tablet by mouth Every Night. 90 tablet 1   • TRI-PREVIFEM 0.18/0.215/0.25 MG-35 MCG per tablet TAKE 1 TABLET BY MOUTH DAILY 28 tablet 11   • [DISCONTINUED] citalopram (CELEXA) 10 MG tablet Take 1 tablet by mouth Daily. 90 tablet 0   • [DISCONTINUED] pantoprazole (PROTONIX) 40 MG EC tablet Take 1 tablet by mouth Daily. 90 tablet 0     No current facility-administered medications on file prior to visit.      Review of Systems   Constitutional: Negative for chills, fatigue, fever and weight loss.   HENT: Negative for sore throat.    Respiratory: Negative for cough, shortness of breath and wheezing.    Cardiovascular: Positive for chest pain.   Gastrointestinal: Positive for heartburn and nausea. Negative for abdominal pain, blood in stool, melena and vomiting.        Heartburn   Musculoskeletal: Negative for back pain and joint swelling.   Skin: Negative for pallor and rash.   Neurological: Negative for dizziness, light-headedness and headaches.   Hematological: Negative for  adenopathy. Does not bruise/bleed easily.       Objective   Physical Exam   Constitutional: She appears well-developed and well-nourished.   HENT:   Head: Normocephalic and atraumatic.   Nose: Nose normal.   Mouth/Throat: Oropharynx is clear and moist.   Eyes: Conjunctivae are normal.   Neck: Neck supple. No thyromegaly present.   Cardiovascular: Normal rate and regular rhythm.    Pulmonary/Chest: Effort normal and breath sounds normal.   Abdominal: Soft. Bowel sounds are normal. She exhibits no mass. There is no tenderness.   Musculoskeletal: She exhibits no tenderness or deformity.   Neurological: She has normal reflexes.   Skin: Skin is dry. No rash noted.   Psychiatric: She has a normal mood and affect. Her behavior is normal.           Patient Active Problem List   Diagnosis   • Essential hypertension   • Obesity (BMI 35.0-39.9 without comorbidity)   • Mild intermittent asthma without complication   • Generalized anxiety disorder   • Dysphagia   • Herpes simplex esophagitis   • Gastritis               Assessment/Plan   Elsie was seen today for heartburn.    Diagnoses and all orders for this visit:    Gastroesophageal reflux disease with esophagitis    Other orders  -     pantoprazole (PROTONIX) 40 MG EC tablet; Take 1 tablet by mouth Daily.  -     sucralfate (CARAFATE) 1 GM/10ML suspension; Take 10 mL by mouth 4 (Four) Times a Day.           Plan      Protonix 40 mg daily.  Carafate 1 gr PO tid.  She is to avoid  fried, fatty foods and gastric irritants.  Advised to avoid eating 3-4 hours before bed and elevate head of bed if nighttime symptoms.  EGD if not improved.    Follow up in 4 weeks.        This document has been electronically signed by Aggie Landaverde MD on February 5, 2018 11:14 PM

## 2018-02-06 NOTE — PATIENT INSTRUCTIONS
Heartburn  Heartburn is a type of pain or discomfort that can happen in the throat or chest. It is often described as a burning pain. It may also cause a bad taste in the mouth. Heartburn may feel worse when you lie down or bend over, and it is often worse at night. Heartburn may be caused by stomach contents that move back up into the esophagus (reflux).  Follow these instructions at home:  Take these actions to decrease your discomfort and to help avoid complications.  Diet  · Follow a diet as recommended by your health care provider. This may involve avoiding foods and drinks such as:  ¨ Coffee and tea (with or without caffeine).  ¨ Drinks that contain alcohol.  ¨ Energy drinks and sports drinks.  ¨ Carbonated drinks or sodas.  ¨ Chocolate and cocoa.  ¨ Peppermint and mint flavorings.  ¨ Garlic and onions.  ¨ Horseradish.  ¨ Spicy and acidic foods, including peppers, chili powder, tran powder, vinegar, hot sauces, and barbecue sauce.  ¨ Citrus fruit juices and citrus fruits, such as oranges, donnie, and limes.  ¨ Tomato-based foods, such as red sauce, chili, salsa, and pizza with red sauce.  ¨ Fried and fatty foods, such as donuts, french fries, potato chips, and high-fat dressings.  ¨ High-fat meats, such as hot dogs and fatty cuts of red and white meats, such as rib eye steak, sausage, ham, and henderson.  ¨ High-fat dairy items, such as whole milk, butter, and cream cheese.  · Eat small, frequent meals instead of large meals.  · Avoid drinking large amounts of liquid with your meals.  · Avoid eating meals during the 2-3 hours before bedtime.  · Avoid lying down right after you eat.  · Do not exercise right after you eat.  General instructions  · Pay attention to any changes in your symptoms.  · Take over-the-counter and prescription medicines only as told by your health care provider. Do not take aspirin, ibuprofen, or other NSAIDs unless your health care provider told you to do so.  · Do not use any tobacco  products, including cigarettes, chewing tobacco, and e-cigarettes. If you need help quitting, ask your health care provider.  · Wear loose-fitting clothing. Do not wear anything tight around your waist that causes pressure on your abdomen.  · Raise (elevate) the head of your bed about 6 inches (15 cm).  · Try to reduce your stress, such as with yoga or meditation. If you need help reducing stress, ask your health care provider.  · If you are overweight, reduce your weight to an amount that is healthy for you. Ask your health care provider for guidance about a safe weight loss goal.  · Keep all follow-up visits as told by your health care provider. This is important.  Contact a health care provider if:  · You have new symptoms.  · You have unexplained weight loss.  · You have difficulty swallowing, or it hurts to swallow.  · You have wheezing or a persistent cough.  · Your symptoms do not improve with treatment.  · You have frequent heartburn for more than two weeks.  Get help right away if:  · You have pain in your arms, neck, jaw, teeth, or back.  · You feel sweaty, dizzy, or light-headed.  · You have chest pain or shortness of breath.  · You vomit and your vomit looks like blood or coffee grounds.  · Your stool is bloody or black.  This information is not intended to replace advice given to you by your health care provider. Make sure you discuss any questions you have with your health care provider.  Document Released: 05/06/2010 Document Revised: 05/25/2017 Document Reviewed: 04/13/2016  [employer] Interactive Patient Education © 2017 [employer]

## 2018-05-09 ENCOUNTER — OFFICE VISIT (OUTPATIENT)
Dept: INTERNAL MEDICINE | Facility: CLINIC | Age: 28
End: 2018-05-09

## 2018-05-09 ENCOUNTER — APPOINTMENT (OUTPATIENT)
Dept: LAB | Facility: HOSPITAL | Age: 28
End: 2018-05-09

## 2018-05-09 VITALS
DIASTOLIC BLOOD PRESSURE: 80 MMHG | BODY MASS INDEX: 35.39 KG/M2 | SYSTOLIC BLOOD PRESSURE: 160 MMHG | WEIGHT: 207.3 LBS | HEIGHT: 64 IN

## 2018-05-09 DIAGNOSIS — R03.0 ELEVATED BLOOD PRESSURE READING: ICD-10-CM

## 2018-05-09 DIAGNOSIS — K58.1 IRRITABLE BOWEL SYNDROME WITH CONSTIPATION: ICD-10-CM

## 2018-05-09 DIAGNOSIS — R10.84 GENERALIZED ABDOMINAL PAIN: Primary | ICD-10-CM

## 2018-05-09 LAB
ALBUMIN SERPL-MCNC: 4.4 G/DL (ref 3.4–4.8)
ALBUMIN/GLOB SERPL: 1.3 G/DL (ref 1.1–1.8)
ALP SERPL-CCNC: 72 U/L (ref 38–126)
ALT SERPL W P-5'-P-CCNC: 32 U/L (ref 9–52)
ANION GAP SERPL CALCULATED.3IONS-SCNC: 11 MMOL/L (ref 5–15)
AST SERPL-CCNC: 30 U/L (ref 14–36)
BILIRUB SERPL-MCNC: 0.2 MG/DL (ref 0.2–1.3)
BUN BLD-MCNC: 10 MG/DL (ref 7–21)
BUN/CREAT SERPL: 11.2 (ref 7–25)
CALCIUM SPEC-SCNC: 9.5 MG/DL (ref 8.4–10.2)
CHLORIDE SERPL-SCNC: 102 MMOL/L (ref 95–110)
CO2 SERPL-SCNC: 24 MMOL/L (ref 22–31)
CREAT BLD-MCNC: 0.89 MG/DL (ref 0.5–1)
DEPRECATED RDW RBC AUTO: 40.4 FL (ref 36.4–46.3)
ERYTHROCYTE [DISTWIDTH] IN BLOOD BY AUTOMATED COUNT: 13.7 % (ref 11.5–14.5)
GFR SERPL CREATININE-BSD FRML MDRD: 76 ML/MIN/1.73 (ref 71–165)
GLOBULIN UR ELPH-MCNC: 3.4 GM/DL (ref 2.3–3.5)
GLUCOSE BLD-MCNC: 82 MG/DL (ref 60–100)
HCT VFR BLD AUTO: 37.2 % (ref 35–45)
HGB BLD-MCNC: 12.5 G/DL (ref 12–15.5)
MCH RBC QN AUTO: 27 PG (ref 26.5–34)
MCHC RBC AUTO-ENTMCNC: 33.6 G/DL (ref 31.4–36)
MCV RBC AUTO: 80.3 FL (ref 80–98)
PLATELET # BLD AUTO: 287 10*3/MM3 (ref 150–450)
PMV BLD AUTO: 11 FL (ref 8–12)
POTASSIUM BLD-SCNC: 3.9 MMOL/L (ref 3.5–5.1)
PROT SERPL-MCNC: 7.8 G/DL (ref 6.3–8.6)
RBC # BLD AUTO: 4.63 10*6/MM3 (ref 3.77–5.16)
SODIUM BLD-SCNC: 137 MMOL/L (ref 137–145)
TSH SERPL DL<=0.05 MIU/L-ACNC: 2.1 MIU/ML (ref 0.46–4.68)
WBC NRBC COR # BLD: 7.72 10*3/MM3 (ref 3.2–9.8)

## 2018-05-09 PROCEDURE — 80053 COMPREHEN METABOLIC PANEL: CPT | Performed by: INTERNAL MEDICINE

## 2018-05-09 PROCEDURE — 99214 OFFICE O/P EST MOD 30 MIN: CPT | Performed by: INTERNAL MEDICINE

## 2018-05-09 PROCEDURE — 84443 ASSAY THYROID STIM HORMONE: CPT | Performed by: INTERNAL MEDICINE

## 2018-05-09 PROCEDURE — 36415 COLL VENOUS BLD VENIPUNCTURE: CPT | Performed by: INTERNAL MEDICINE

## 2018-05-09 PROCEDURE — 85027 COMPLETE CBC AUTOMATED: CPT | Performed by: INTERNAL MEDICINE

## 2018-05-09 RX ORDER — LUBIPROSTONE 8 UG/1
8 CAPSULE ORAL 2 TIMES DAILY WITH MEALS
Qty: 60 CAPSULE | Refills: 2 | Status: SHIPPED | OUTPATIENT
Start: 2018-05-09 | End: 2018-06-27 | Stop reason: SDUPTHER

## 2018-05-11 ENCOUNTER — TELEPHONE (OUTPATIENT)
Dept: INTERNAL MEDICINE | Facility: CLINIC | Age: 28
End: 2018-05-11

## 2018-05-29 RX ORDER — MONTELUKAST SODIUM 10 MG/1
10 TABLET ORAL NIGHTLY
Qty: 90 TABLET | Refills: 1 | Status: SHIPPED | OUTPATIENT
Start: 2018-05-29

## 2018-06-27 RX ORDER — LUBIPROSTONE 8 UG/1
8 CAPSULE ORAL 2 TIMES DAILY WITH MEALS
Qty: 180 CAPSULE | Refills: 1 | Status: SHIPPED | OUTPATIENT
Start: 2018-06-27 | End: 2018-07-31

## 2018-07-31 ENCOUNTER — OFFICE VISIT (OUTPATIENT)
Dept: INTERNAL MEDICINE | Facility: CLINIC | Age: 28
End: 2018-07-31

## 2018-07-31 VITALS
SYSTOLIC BLOOD PRESSURE: 140 MMHG | OXYGEN SATURATION: 99 % | HEART RATE: 85 BPM | DIASTOLIC BLOOD PRESSURE: 90 MMHG | BODY MASS INDEX: 34.12 KG/M2 | WEIGHT: 198.9 LBS

## 2018-07-31 DIAGNOSIS — K58.1 IRRITABLE BOWEL SYNDROME WITH CONSTIPATION: Primary | ICD-10-CM

## 2018-07-31 DIAGNOSIS — J06.9 UPPER RESPIRATORY TRACT INFECTION, UNSPECIFIED TYPE: ICD-10-CM

## 2018-07-31 DIAGNOSIS — I10 ESSENTIAL HYPERTENSION: ICD-10-CM

## 2018-07-31 PROCEDURE — 99214 OFFICE O/P EST MOD 30 MIN: CPT | Performed by: INTERNAL MEDICINE

## 2018-08-10 ENCOUNTER — TELEPHONE (OUTPATIENT)
Dept: INTERNAL MEDICINE | Facility: CLINIC | Age: 28
End: 2018-08-10

## 2018-08-10 DIAGNOSIS — K58.1 IRRITABLE BOWEL SYNDROME WITH CONSTIPATION: Primary | ICD-10-CM

## 2018-08-10 RX ORDER — AZITHROMYCIN 250 MG/1
TABLET, FILM COATED ORAL
Qty: 6 TABLET | Refills: 0 | Status: SHIPPED | OUTPATIENT
Start: 2018-08-10

## 2018-08-10 NOTE — TELEPHONE ENCOUNTER
Dr. Landaverde told her to call back to let her know if the nasal spray was working and it is not so she was going to send her in an antibiotic.     She was supposed to put in a referral to a dietitian for IBS.

## 2018-09-05 ENCOUNTER — HOSPITAL ENCOUNTER (OUTPATIENT)
Dept: NUTRITION | Facility: HOSPITAL | Age: 28
Discharge: HOME | End: 2018-09-05
Attending: INTERNAL MEDICINE | Admitting: DIETITIAN, REGISTERED

## 2018-09-05 VITALS — WEIGHT: 198 LBS | BODY MASS INDEX: 33.8 KG/M2 | HEIGHT: 64 IN

## 2018-09-05 PROCEDURE — 97802 MEDICAL NUTRITION INDIV IN: CPT | Performed by: DIETITIAN, REGISTERED

## 2018-09-05 NOTE — PROGRESS NOTES
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 She was referred by dr. asher for irritable bowel syndrome and constipation.  Pt described a typical intake for a day and there is very little fiber in her diet. She lost 25 lb by following a high protein diet and has constipation most days. She has tried many medication prescribed by her MD to tx the IBSc. Discussion today included how to get 25-30 grams of fiber into her busy lifestyle. Suggestions were given about fruits and vegetables to choose/ how to prepare, purchase and cook them. The patient seems pleased with the info she received and motivated to follow the guidelines to promote regularity. This RDN will follow within 1 month.             Nutrition/Diet History     Row Name 09/05/18 1632          Nutrition/Diet History    Typical Food/Fluid Intake 3 meals low fiber diet     Food Preferences likes vegetables , doesn't eat fruits much               Anthropometrics     Row Name 09/05/18 1629          Anthropometrics    Height 162.6 cm (64.02\")     Weight 89.8 kg (198 lb)        Ideal Body Weight (IBW)    Ideal Body Weight (IBW) (kg) 55.04     % Ideal Body Weight 163.17        Body Mass Index (BMI)    BMI (kg/m2) 34.04        IBW Adjustment, Para/Tetraplegia    5% Adjustment, Para (IBW) 52.29     10% Adjustment, Para (IBW) 49.54     10% Adjustment, Tetra (IBW) 49.54     15% Adjustment, Tetra (IBW) 46.78             Labs/Tests/Procedures/Meds     Row Name 09/05/18 1633          Labs/Procedures/Meds    Lab Results Reviewed reviewed        Medications    Pertinent Medications Reviewed reviewed             Physical Findings     Row Name 09/05/18 1629          Physical Findings    Overall Physical Appearance overweight             Estimated/Assessed Needs     " "Row Name 09/05/18 1633 09/05/18 1629       Calculation Measurements    Height  -- 162.6 cm (64.02\")       Estimated/Assessed Needs    Additional Documentation Calorie Requirements (Group)  --       Calorie Requirements    Estimated Calorie Requirement (kcal/day) 1400   for weight loss  --              Evaluation of Received Nutrient/Fluid Intake     Row Name 09/05/18 1629          Calculation Measurements    Height 162.6 cm (64.02\")             Evaluation of Prescribed Nutrient/Fluid Intake     Row Name 09/05/18 1629          Calculation Measurements    Height 162.6 cm (64.02\")           Electronically signed by:  Dahiana Santos, CHELA  09/05/18 4:34 PM  "

## 2018-10-17 ENCOUNTER — APPOINTMENT (OUTPATIENT)
Dept: NUTRITION | Facility: HOSPITAL | Age: 28
End: 2018-10-17

## 2018-10-22 RX ORDER — PANTOPRAZOLE SODIUM 40 MG/1
40 TABLET, DELAYED RELEASE ORAL DAILY
Qty: 90 TABLET | Refills: 2 | Status: SHIPPED | OUTPATIENT
Start: 2018-10-22

## 2018-10-30 ENCOUNTER — HOSPITAL ENCOUNTER (OUTPATIENT)
Dept: NUTRITION | Facility: HOSPITAL | Age: 28
Discharge: HOME | End: 2018-10-30
Admitting: DIETITIAN, REGISTERED

## 2018-10-30 PROCEDURE — 97803 MED NUTRITION INDIV SUBSEQ: CPT | Performed by: DIETITIAN, REGISTERED

## 2018-10-30 NOTE — PROGRESS NOTES
Adult Outpatient Nutrition  Assessment    Patient Name:  Elsie Montiel  YOB: 1990  MRN: [id number]    Assessment Date:  10/30/2018    Comments:  Pt returned for a follow up visit with her  . They are on vacation together this week.  Discussion centered on how to restart the healthy meal plan pt followed for a time. Gave many ideas of breakfast and other meals that are healthy, easy to prepare, can be prepared ahead of time, are tasty and would reduce monotony. Pt is not a breakfast eater. She has gained 8 lb since taking prednisone for a sinus infection she reports. She and her  enjoy working out together but due to work schedules they are unable to work out in the am. Discussed how they can continue their exercise regime for good health. Pt will return for a follow up visit in one month. Education will continue then.           General Info     Row Name 10/30/18 1219       Today's Session    Person(s) attending today's session Patient;Spouse              Physical Findings     Row Name 10/30/18 1219          Physical Findings    Overall Physical Appearance overweight               Nutritional Info/Activity     Row Name 10/30/18 1219       Nutritional Information    Have you had weight changes? Yes    Describe weight changes weight loss and gain(after prednisone)    Have you tried to lose weight before? Yes    List programs tried, date, and success counting macros    What is your motivation to lose weight? feel better    Functional Status able to prepare meals;ambulatory;able to purchase food    What is the biggest challenge you have with your diet? Poor choices;Eating out;Knowledge       Eating Environment    Eating environment Family       Physical Activity    Are you currently involved in an activity/exercise program?  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 plans to get back on the plan soon. she is on vacation this week.     Food Preferences dislikes breakfast food.  asked about how to avoid getting burned out on breakfast.     Meal/Snack Patterns RDN wrote a list of ideas of foods for breakfast that are easy to make/improve variety/ and taste good.             Estimated/Assessed Needs     Row Name 10/30/18 1223          Estimated/Assessed Needs    Additional Documentation Calorie Requirements (Group)        Calorie Requirements    Estimated Calorie Requirement (kcal/day) 1500               Electronically signed by:  Dahiana Santos RD  10/30/18 12:24 PM

## (undated) DEVICE — CANN SMPL SOFTECH BIFLO ETCO2 A/M 7FT

## (undated) DEVICE — SINGLE-USE BIOPSY FORCEPS: Brand: RADIAL JAW 4

## (undated) DEVICE — BITEBLOCK ENDO W/STRAP 60F A/ LF DISP